# Patient Record
Sex: FEMALE | Race: WHITE | Employment: UNEMPLOYED | URBAN - METROPOLITAN AREA
[De-identification: names, ages, dates, MRNs, and addresses within clinical notes are randomized per-mention and may not be internally consistent; named-entity substitution may affect disease eponyms.]

---
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
## 2023-12-19 LAB
ABO, EXTERNAL RESULT: NORMAL
HEP B, EXTERNAL RESULT: NEGATIVE
HEPATITIS C ANTIBODY, EXTERNAL RESULT: NONREACTIVE
HIV, EXTERNAL RESULT: NONREACTIVE
RH FACTOR, EXTERNAL RESULT: POSITIVE
RUBELLA TITER, EXTERNAL RESULT: NORMAL

## 2023-12-28 ENCOUNTER — HOSPITAL ENCOUNTER (OUTPATIENT)
Dept: ULTRASOUND IMAGING | Age: 18
Discharge: HOME | End: 2023-12-28
Attending: STUDENT IN AN ORGANIZED HEALTH CARE EDUCATION/TRAINING PROGRAM

## 2023-12-28 DIAGNOSIS — O36.80X0 PREGNANCY WITH INCONCLUSIVE FETAL VIABILITY, SINGLE OR UNSPECIFIED FETUS: ICD-10-CM

## 2023-12-28 PROCEDURE — 76815 OB US LIMITED FETUS(S): CPT

## 2024-01-02 LAB
C. TRACHOMATIS, EXTERNAL RESULT: NEGATIVE
N. GONORRHOEAE, EXTERNAL RESULT: NEGATIVE

## 2024-06-12 LAB
GBS, EXTERNAL RESULT: NEGATIVE
RPR, EXTERNAL RESULT: NONREACTIVE

## 2024-06-30 ENCOUNTER — HOSPITAL ENCOUNTER (OUTPATIENT)
Age: 19
Discharge: HOME HEALTH | End: 2024-07-01
Attending: OBSTETRICS & GYNECOLOGY | Admitting: OBSTETRICS & GYNECOLOGY
Payer: COMMERCIAL

## 2024-07-01 VITALS
WEIGHT: 175 LBS | DIASTOLIC BLOOD PRESSURE: 86 MMHG | OXYGEN SATURATION: 98 % | RESPIRATION RATE: 16 BRPM | BODY MASS INDEX: 29.16 KG/M2 | HEART RATE: 88 BPM | TEMPERATURE: 98.2 F | SYSTOLIC BLOOD PRESSURE: 124 MMHG | HEIGHT: 65 IN

## 2024-07-01 PROBLEM — O47.9 BRAXTON HICKS CONTRACTIONS: Status: ACTIVE | Noted: 2024-07-01

## 2024-07-01 PROBLEM — O99.013 ANEMIA COMPLICATING PREGNANCY IN THIRD TRIMESTER: Status: ACTIVE | Noted: 2024-07-01

## 2024-07-01 PROCEDURE — 99203 OFFICE O/P NEW LOW 30 MIN: CPT

## 2024-07-01 RX ORDER — FAMOTIDINE 20 MG/1
20 TABLET, FILM COATED ORAL 2 TIMES DAILY
COMMUNITY

## 2024-07-01 RX ORDER — FERROUS SULFATE 325(65) MG
325 TABLET ORAL 2 TIMES DAILY
COMMUNITY

## 2024-07-01 NOTE — DISCHARGE SUMMARY
Department of Obstetrics and Gynecology  Attending DischargeSalma rosa         SUBJECTIVE:  17 y/o  @ 39.3 wks by 12 wk US  c/w 18 wk US, final EDC 24 presents to L&D c/o ?LOF and contractions. Pt was seen by  Dr. Gordillo & had a negative fern test along w/ physical exam neg for spontaneous rupture of membranes. She was monitored for an additional two hours and had no cervical change. Pt denies VB.  Reports she is feeling very few contractions and fetal movement is present.    Preg c/b pectus excavatum surgery, vaping nicotine, hemorrhoids, anemia    PNL  T&S - A positive  Rubella - Immune  RPR NR  UA Cx - Neg  HBsAg - Neg  HIV Neg  HCV Neg  GC/CT/Trich - Neg  1 hr GCT - 99 (24)  RPR  - NR  GBS -Negative    OBJECTIVE    Vitals:  Vitals:    24 2359   BP: 124/86   Pulse: 88   Resp: 16   Temp: 98.2 °F (36.8 °C)   TempSrc: Oral   SpO2: 98%   Weight: 79.4 kg (175 lb)   Height: 1.651 m (5' 5\")        CONSTITUTIONAL:  awake, alert, cooperative, no apparent distress, and appears stated age  LUNGS:  No increased work of breathing, good air exchange, clear to auscultation bilaterally, no crackles or wheezing  CARDIOVASCULAR:  Normal apical impulse, regular rate and rhythm,  ABDOMEN: Gravid, no scars, normal bowel sounds, soft, non-distended, non-tender, no masses palpated  EXT: No C/C/E    Cervix:           FT/50/-3/posterior  (Exam per Triage RN - Iram)    Fetal Position:  Cephalic    Membranes:  Intact    Fetal heart rate:         Baseline Heart Rate:  125, Cat I         Accelerations:  present       Decelerations:  absent       Variability:  moderate    Contraction frequency: q 7 minutes      Discharge condition: stable    ASSESSMENT & PLAN:    17 y/o  @ 39.3 wks here to r/o SROM & Labor  1)SROM- no s/sx's of SROM per Dr. Gordillo's note  2)Labor - no cervical change since last exam in office last week; LW/ANITA d/w pt. Pt encouraged to keep sched appt on 24.  3)D/C to home

## 2024-07-01 NOTE — PROGRESS NOTES
Call to Dr. Dotson to inform her of pt arrival. I told her about pt c/o gush of fluid at 1745 and that ctx started at around 1900. Cat 1 tracing with 2 ctx 10 min apart rating them a 2 on the 1-10 pain scale. Orders received to have House MD bradford and then check pt and if suzette negative, monitor for another hour and recheck cervix.

## 2024-07-01 NOTE — DISCHARGE INSTRUCTIONS
If you had a vaginal exam you may have some bloody mucous or brown vaginal discharge.   Call office with any questions  Drink plenty of water  Go to your scheduled appointment on 7/5/24        Labor Check: After Your Visit   Your Care Instructions   If you pass your due date and your labor does not start on its own, your doctor may want to try to start (induce) labor. Your doctor may suggest inducing labor for other reasons. It may be a good idea to induce labor if you have another medical condition, such as high blood pressure, or if the placenta can no longer give enough support to the baby.   Your doctor may take several steps to get your labor going.   Your doctor will check to see if the opening to the womb (cervix) is ready and if your baby is low in your pelvis. The cervix is ready for active labor when it is soft and begins to open. If it is not ready, your doctor may use medicine to soften it.   Your doctor may \"break your water\" (rupture the amniotic sac) if your cervix is soft and has begun to open but active labor does not start. To break your sac, your doctor will insert a slim instrument into your vagina. He or she will pull gently on the sac until it breaks. You probably will not feel any pain from the sac breaking. But you may feel and hear a large gush of fluid.   If you do not start active labor after your sac breaks, your doctor probably will give you medicine such as oxytocin (Pitocin) to start labor and keep your labor going.    Follow-up care is a key part of your treatment and safety. Be sure to make and go to all appointments, and call your doctor if you are having problems. It's also a good idea to know your test results and keep a list of the medicines you take.     When should your labor be induced?   Your pregnancy has gone 1 to 2 weeks past your expected due date.   You have a medical condition, such as high blood pressure, preeclampsia, or diabetes, that may harm your health or the

## 2024-07-01 NOTE — DISCHARGE SUMMARY
Department of Obstetrics and Gynecology  Attending DischargeSummary         SUBJECTIVE:  19 y/o  @ 39.3 wks by FDLMP 12 US c/w 18 wk US, final EDC 24 presents to L&D c/o ?LOF and contractions. Pt was seen by LUCIANA Gordillo & had a negative fern test along w/ physical exam neg for spontaneous rupture of membranes. She was monitored for an additional two hours and had no cervical change. Pt denies VB.  Reports she is feeling very few contractions and fetal movement is present.      OBJECTIVE    Vitals:  24 @23:45    BP              124/86     BP      Temp              98.2 ...     Temp     Pulse              88     Pulse     Respirations              16     Respirations     SpO2              98     SpO2                CONSTITUTIONAL:  awake, alert, cooperative, no apparent distress, and appears stated age  LUNGS:  No increased work of breathing, good air exchange, clear to auscultation bilaterally, no crackles or wheezing  CARDIOVASCULAR:  Normal apical impulse, regular rate and rhythm,  ABDOMEN: Gravid, no scars, normal bowel sounds, soft, non-distended, non-tender, no masses palpated  EXT: No C/C/E    Cervix:           FT/50/-3/posterior  (Exam per Triage RN - Iram)    Fetal Position:  Cephalic    Membranes:  Intact    Fetal heart rate:         Baseline Heart Rate:  125, Cat I         Accelerations:  present       Decelerations:  absent       Variability:  moderate    Contraction frequency: q 7 minutes      Discharge condition: stable    ASSESSMENT & PLAN:    19 y/o  @ 39.3 wks here to r/o SROM & Labor  1)SROM- no s/sx's of SROM per Dr. Gordillo's note  2)Labor - no cervical change since last exam in office last week; ADIEL/ANITA d/w pt. Pt encouraged to keep sched appt on 24.  3)D/C to home

## 2024-07-01 NOTE — H&P
Department of Obstetrics and Gynecology  Labor and Delivery  Attending Triage Note      SUBJECTIVE:  Pt. c/o leakage of fluid. States earlier this evening had a large gush of fluid, thinks it has maybe continued since then. No VB, +FM. Having some contractions as well.     OB History    Para Term  AB Living   1 0 0 0 0 0   SAB IAB Ectopic Molar Multiple Live Births   0 0 0 0 0 0      # Outcome Date GA Lbr Rock/2nd Weight Sex Delivery Anes PTL Lv   1 Current            .  Past Medical History:   Diagnosis Date    Anemia      Past Surgical History:   Procedure Laterality Date    PECTUS EXCAVATUM SURGERY      TONSILLECTOMY      TONSILLECTOMY AND ADENOIDECTOMY       No current facility-administered medications on file prior to encounter.     Current Outpatient Medications on File Prior to Encounter   Medication Sig Dispense Refill    Prenatal MV-Min-Fe Fum-FA-DHA (PRENATAL 1 PO) Take by mouth      ferrous sulfate (IRON 325) 325 (65 Fe) MG tablet Take 1 tablet by mouth 2 times daily      famotidine (PEPCID) 20 MG tablet Take 1 tablet by mouth 2 times daily      Doxylamine Succinate, Sleep, (UNISOM PO) Take 1 tablet by mouth nightly as needed (for sleep)       No Known Allergies      OBJECTIVE    Vitals:  Vitals:    24 2359   BP: 124/86   Pulse: 88   Resp: 16   Temp: 98.2 °F (36.8 °C)   SpO2: 98%       Physical Exam  HENT:      Head: Normocephalic.   Cardiovascular:      Rate and Rhythm: Normal rate.   Pulmonary:      Effort: Pulmonary effort is normal.   Abdominal:      Palpations: Abdomen is soft.      Tenderness: There is no abdominal tenderness. There is no guarding or rebound.   Genitourinary:     Comments: SSE: negative pool, fern, valsalva  Neurological:      Mental Status: She is alert.       Cervix:  fingertip/50/-3  Membranes:  Intact    Fetal heart rate:  125, moderate variability, +accel, -decel  Contraction frequency: q3-7min      ASSESSMENT & PLAN:    Sarah Smith is a 18 y.o.

## 2024-07-01 NOTE — PROGRESS NOTES
Madan Pendleton MD notified about pt in triage that needed a FERN test. She said she would be right out

## 2024-07-01 NOTE — PROGRESS NOTES
Madan Pendleton MD at bedside explaining that pt was FERN negative at this time.     I explained to patient that Dr. Dotson wanted us to watch her for an hour and recheck her cervix. Patient was fine with that plan

## 2024-07-01 NOTE — PROGRESS NOTES
Pt verbalized understanding of verbal and written discharge instructions and denies having questions at this time. Pt left OB unit at 0211 ambulatory, undelivered, and in stable condition, accompanied by FOB. Patient is not in active labor.

## 2024-07-09 ENCOUNTER — HOSPITAL ENCOUNTER (INPATIENT)
Age: 19
LOS: 3 days | Discharge: HOME | End: 2024-07-12
Attending: OBSTETRICS & GYNECOLOGY | Admitting: OBSTETRICS & GYNECOLOGY
Payer: COMMERCIAL

## 2024-07-09 ENCOUNTER — ANESTHESIA (OUTPATIENT)
Dept: LABOR AND DELIVERY | Age: 19
End: 2024-07-09
Payer: COMMERCIAL

## 2024-07-09 ENCOUNTER — ANESTHESIA EVENT (OUTPATIENT)
Dept: LABOR AND DELIVERY | Age: 19
End: 2024-07-09
Payer: COMMERCIAL

## 2024-07-09 PROBLEM — O47.00 PRETERM CONTRACTIONS: Status: ACTIVE | Noted: 2024-07-09

## 2024-07-09 LAB
ABO + RH BLD: NORMAL
AMPHETAMINES UR QL SCN>1000 NG/ML: NORMAL
BARBITURATES UR QL SCN>200 NG/ML: NORMAL
BENZODIAZ UR QL SCN>200 NG/ML: NORMAL
BLD GP AB SCN SERPL QL: NORMAL
BUPRENORPHINE+NOR UR QL SCN: NORMAL
CANNABINOIDS UR QL SCN>50 NG/ML: NORMAL
COCAINE UR QL SCN: NORMAL
DEPRECATED RDW RBC AUTO: 15.9 % (ref 12.4–15.4)
DRUG SCREEN COMMENT UR-IMP: NORMAL
FENTANYL SCREEN, URINE: NORMAL
HCT VFR BLD AUTO: 32.5 % (ref 36–48)
HGB BLD-MCNC: 11.4 G/DL (ref 12–16)
MCH RBC QN AUTO: 32.3 PG (ref 26–34)
MCHC RBC AUTO-ENTMCNC: 35.1 G/DL (ref 31–36)
MCV RBC AUTO: 91.9 FL (ref 80–100)
METHADONE UR QL SCN>300 NG/ML: NORMAL
OPIATES UR QL SCN>300 NG/ML: NORMAL
OXYCODONE UR QL SCN: NORMAL
PCP UR QL SCN>25 NG/ML: NORMAL
PH UR STRIP: 7 [PH]
PLATELET # BLD AUTO: 234 K/UL (ref 135–450)
PMV BLD AUTO: 8 FL (ref 5–10.5)
RBC # BLD AUTO: 3.53 M/UL (ref 4–5.2)
WBC # BLD AUTO: 10.4 K/UL (ref 4–11)

## 2024-07-09 PROCEDURE — 86901 BLOOD TYPING SEROLOGIC RH(D): CPT

## 2024-07-09 PROCEDURE — 86900 BLOOD TYPING SEROLOGIC ABO: CPT

## 2024-07-09 PROCEDURE — 86850 RBC ANTIBODY SCREEN: CPT

## 2024-07-09 PROCEDURE — 85027 COMPLETE CBC AUTOMATED: CPT

## 2024-07-09 PROCEDURE — 6360000002 HC RX W HCPCS: Performed by: OBSTETRICS & GYNECOLOGY

## 2024-07-09 PROCEDURE — 80307 DRUG TEST PRSMV CHEM ANLYZR: CPT

## 2024-07-09 PROCEDURE — 1220000000 HC SEMI PRIVATE OB R&B

## 2024-07-09 PROCEDURE — 6360000002 HC RX W HCPCS: Performed by: NURSE ANESTHETIST, CERTIFIED REGISTERED

## 2024-07-09 PROCEDURE — 86780 TREPONEMA PALLIDUM: CPT

## 2024-07-09 PROCEDURE — 2500000003 HC RX 250 WO HCPCS: Performed by: NURSE ANESTHETIST, CERTIFIED REGISTERED

## 2024-07-09 PROCEDURE — 2580000003 HC RX 258: Performed by: OBSTETRICS & GYNECOLOGY

## 2024-07-09 RX ORDER — TERBUTALINE SULFATE 1 MG/ML
0.25 INJECTION, SOLUTION SUBCUTANEOUS
Status: DISCONTINUED | OUTPATIENT
Start: 2024-07-09 | End: 2024-07-10

## 2024-07-09 RX ORDER — ACETAMINOPHEN 325 MG/1
650 TABLET ORAL EVERY 4 HOURS PRN
Status: DISCONTINUED | OUTPATIENT
Start: 2024-07-09 | End: 2024-07-10

## 2024-07-09 RX ORDER — BUPIVACAINE HYDROCHLORIDE 2.5 MG/ML
INJECTION, SOLUTION EPIDURAL; INFILTRATION; INTRACAUDAL
Status: COMPLETED
Start: 2024-07-09 | End: 2024-07-09

## 2024-07-09 RX ORDER — SODIUM CHLORIDE, SODIUM LACTATE, POTASSIUM CHLORIDE, AND CALCIUM CHLORIDE .6; .31; .03; .02 G/100ML; G/100ML; G/100ML; G/100ML
500 INJECTION, SOLUTION INTRAVENOUS PRN
Status: DISCONTINUED | OUTPATIENT
Start: 2024-07-09 | End: 2024-07-10

## 2024-07-09 RX ORDER — METHYLERGONOVINE MALEATE 0.2 MG/ML
200 INJECTION INTRAVENOUS PRN
Status: DISCONTINUED | OUTPATIENT
Start: 2024-07-09 | End: 2024-07-10

## 2024-07-09 RX ORDER — TRANEXAMIC ACID 10 MG/ML
1000 INJECTION, SOLUTION INTRAVENOUS
Status: DISCONTINUED | OUTPATIENT
Start: 2024-07-09 | End: 2024-07-10

## 2024-07-09 RX ORDER — SODIUM CHLORIDE 0.9 % (FLUSH) 0.9 %
5-40 SYRINGE (ML) INJECTION EVERY 12 HOURS SCHEDULED
Status: DISCONTINUED | OUTPATIENT
Start: 2024-07-09 | End: 2024-07-10

## 2024-07-09 RX ORDER — SODIUM CHLORIDE 0.9 % (FLUSH) 0.9 %
5-40 SYRINGE (ML) INJECTION PRN
Status: DISCONTINUED | OUTPATIENT
Start: 2024-07-09 | End: 2024-07-10

## 2024-07-09 RX ORDER — BUPIVACAINE HYDROCHLORIDE 2.5 MG/ML
INJECTION, SOLUTION EPIDURAL; INFILTRATION; INTRACAUDAL PRN
Status: DISCONTINUED | OUTPATIENT
Start: 2024-07-09 | End: 2024-07-10 | Stop reason: SDUPTHER

## 2024-07-09 RX ORDER — DOCUSATE SODIUM 100 MG/1
1 CAPSULE, LIQUID FILLED ORAL DAILY
COMMUNITY
Start: 2024-06-12

## 2024-07-09 RX ORDER — ONDANSETRON 4 MG/1
4 TABLET, ORALLY DISINTEGRATING ORAL EVERY 6 HOURS PRN
Status: DISCONTINUED | OUTPATIENT
Start: 2024-07-09 | End: 2024-07-10

## 2024-07-09 RX ORDER — SODIUM CHLORIDE, SODIUM LACTATE, POTASSIUM CHLORIDE, CALCIUM CHLORIDE 600; 310; 30; 20 MG/100ML; MG/100ML; MG/100ML; MG/100ML
INJECTION, SOLUTION INTRAVENOUS CONTINUOUS
Status: DISCONTINUED | OUTPATIENT
Start: 2024-07-09 | End: 2024-07-10

## 2024-07-09 RX ORDER — MISOPROSTOL 100 UG/1
400 TABLET ORAL PRN
Status: DISCONTINUED | OUTPATIENT
Start: 2024-07-09 | End: 2024-07-10

## 2024-07-09 RX ORDER — CARBOPROST TROMETHAMINE 250 UG/ML
250 INJECTION, SOLUTION INTRAMUSCULAR PRN
Status: DISCONTINUED | OUTPATIENT
Start: 2024-07-09 | End: 2024-07-10

## 2024-07-09 RX ORDER — SODIUM CHLORIDE 9 MG/ML
25 INJECTION, SOLUTION INTRAVENOUS PRN
Status: DISCONTINUED | OUTPATIENT
Start: 2024-07-09 | End: 2024-07-10

## 2024-07-09 RX ORDER — ONDANSETRON 2 MG/ML
4 INJECTION INTRAMUSCULAR; INTRAVENOUS EVERY 6 HOURS PRN
Status: DISCONTINUED | OUTPATIENT
Start: 2024-07-09 | End: 2024-07-10

## 2024-07-09 RX ADMIN — SODIUM CHLORIDE, POTASSIUM CHLORIDE, SODIUM LACTATE AND CALCIUM CHLORIDE: 600; 310; 30; 20 INJECTION, SOLUTION INTRAVENOUS at 19:24

## 2024-07-09 RX ADMIN — Medication 1 MILLI-UNITS/MIN: at 18:00

## 2024-07-09 RX ADMIN — SODIUM CHLORIDE, POTASSIUM CHLORIDE, SODIUM LACTATE AND CALCIUM CHLORIDE: 600; 310; 30; 20 INJECTION, SOLUTION INTRAVENOUS at 17:14

## 2024-07-09 RX ADMIN — SODIUM CHLORIDE, POTASSIUM CHLORIDE, SODIUM LACTATE AND CALCIUM CHLORIDE: 600; 310; 30; 20 INJECTION, SOLUTION INTRAVENOUS at 20:24

## 2024-07-09 RX ADMIN — Medication 15 ML/HR: at 21:04

## 2024-07-09 RX ADMIN — BUPIVACAINE HYDROCHLORIDE 1.2 ML: 2.5 INJECTION, SOLUTION EPIDURAL; INFILTRATION; INTRACAUDAL; PERINEURAL at 20:59

## 2024-07-09 RX ADMIN — SODIUM CHLORIDE, SODIUM LACTATE, POTASSIUM CHLORIDE, AND CALCIUM CHLORIDE 1000 ML: .6; .31; .03; .02 INJECTION, SOLUTION INTRAVENOUS at 19:50

## 2024-07-09 NOTE — DISCHARGE INSTRUCTIONS
Thank you for the opportunity to care for you and your family.    We hope that you are happy with the care we provided during your stay in the [facility]. We want to ensure that you have the help that you need when you leave the hospital. If there is anything that we can assist you with please let us know.    Breastfeeding mothers may contact our lactation specialists with any problems or questions.  [facility] lactation services phone number is [phone].  Leave a message and your call will be returned.    Please refer to the information provided in the postpartum care booklet.  The following are warning signs to remember.        Call 911 if you have:    Chest pain or pressure  Shortness of breath, even at rest  Thoughts of hurting yourself or others  Seizures    Call your healthcare provider if you have:    Temperature of 100.4 degrees or higher  Stitches that are not healing             --Swelling, bleeding, drainage, foul odor, redness or warmth in/around your                 stitches, staples, or incision (scar)               -- Bad smelling blood or discharge from the vagina  Vaginal bleeding that has increased             --Soaking through one pad in an hour             --You are passing clots larger than the size of a lemon.  Red, warm tender area(s) in your breast or calf.  Headache that does not get better, even after taking medicine; or headache with vision changes    Remember to notify all healthcare providers from your date of delivery up to one year after birth!    CARING FOR YOURSELF      DIET/ACTIVITY    Eat a well balanced diet focusing on food high in fiber and protein.  Drink plenty of fluids, especially water.  To avoid constipation you may take a mild stool softener as recommended by your doctor or midwife.  Gradually increase your activity. Resume an exercise regime only after being advised by your doctor or midwife.  When sitting or lying down, keep your legs elevated to

## 2024-07-09 NOTE — PROGRESS NOTES
MD Arechiga to bedside for SVE. PT remains unchanged @ 3/70/-2. Pitocin currently @ 4 mu/min. No change in current POC. Plan to recheck around 2345 per MD Arechiga.  Leona Mcknight RN

## 2024-07-09 NOTE — PLAN OF CARE
Problem: Vaginal Birth or  Section  Goal: Fetal and maternal status remain reassuring during the birth process  Description:  Birth OB-Pregnancy care plan goal which identifies if the fetal and maternal status remain reassuring during the birth process  Outcome: Progressing     Problem: Pain  Goal: Verbalizes/displays adequate comfort level or baseline comfort level  Outcome: Progressing  Flowsheets (Taken 2024 3095 by Donita Pacheco RN)  Verbalizes/displays adequate comfort level or baseline comfort level:   Encourage patient to monitor pain and request assistance   Assess pain using appropriate pain scale   Administer analgesics based on type and severity of pain and evaluate response   Implement non-pharmacological measures as appropriate and evaluate response   Consider cultural and social influences on pain and pain management   Notify Licensed Independent Practitioner if interventions unsuccessful or patient reports new pain     Problem: Safety - Adult  Goal: Free from fall injury  Outcome: Progressing

## 2024-07-09 NOTE — H&P
Department of Obstetrics and Gynecology  Attending History and Physical        CHIEF COMPLAINT:  contractions    HISTORY OF PRESENT ILLNESS:      The patient is a 18 y.o.  1 parity 0 at 40 weeks 4 days by LMP c/w 12 & 18 wk sono, EDC 24 presents for above. She was seen in the office today and had membranes sweeping performed.  She showed up to L&D triage c/o ctx of increased frequency and intensity. Tadeo irregularly q 2-7 mins. Pain 4/10. Reports fetal movement, denies leakage of fluid, vaginal bleeding.  history: Pectus excavatum- rods in place, nicotine vape use, hemorrhoids, hx anemia- hbg on admission 11.3.     PRENATAL CARE:    [facility]    Blood Type/Rh:  A+  Antibody Screen:  Neg  Rubella:  Immune  RPR:  NR  Hepatitis B Surface Antigen: Neg  HIV:  Neg  Gonorrhea:  Neg  Chlamydia:  Neg  1 hour Glucose Tolerance Test:  99 mg/dL (on 24)  Group B Strep:  negative  (on 24)    cfDNA: ([id number]) negative screen  AFP only: negative screen    Past Medical History:        Diagnosis Date    Anemia      Past Surgical History:        Procedure Laterality Date    PECTUS EXCAVATUM SURGERY      TONSILLECTOMY      TONSILLECTOMY AND ADENOIDECTOMY           OB History    Para Term  AB Living   1             SAB IAB Ectopic Molar Multiple Live Births                    # Outcome Date GA Lbr Rock/2nd Weight Sex Delivery Anes PTL Lv   1 Current                Medications Prior to Admission:   Medications Prior to Admission: docusate sodium (COLACE) 100 MG capsule, Take 1 capsule by mouth daily  Prenatal MV-Min-Fe Fum-FA-DHA (PRENATAL 1 PO), Take by mouth  ferrous sulfate (IRON 325) 325 (65 Fe) MG tablet, Take 1 tablet by mouth 2 times daily  famotidine (PEPCID) 20 MG tablet, Take 1 tablet by mouth 2 times daily  Doxylamine Succinate, Sleep, (UNISOM PO), Take 1 tablet by mouth nightly as needed (for sleep) (Patient not taking: Reported on

## 2024-07-09 NOTE — PROGRESS NOTES
Report received from ELSA Pacheco RN. Bedside report given. Introduced myself to pt as her RN for the day. I put my name and phone number on the white board and showed pt how to use her room phone to get a hold of me. Pt was given her plan of care for the day. Call light within reach. Bed in lowest position and wheels are locked. Pt verbalized understanding and denies any further needs at this time.Continue to monitor.  Leona Mcknight RN

## 2024-07-09 NOTE — FLOWSHEET NOTE
Patient presents to triage with c/o contractions worsening since 11am.  PLaced on EFM and VSS.  Will update Dr Arechiga on patients arrival

## 2024-07-10 PROBLEM — Z98.891 STATUS POST PRIMARY LOW TRANSVERSE CESAREAN SECTION: Status: ACTIVE | Noted: 2024-07-10

## 2024-07-10 PROBLEM — Z37.9 NORMAL LABOR: Status: ACTIVE | Noted: 2024-07-10

## 2024-07-10 PROBLEM — O47.00 PRETERM CONTRACTIONS: Status: RESOLVED | Noted: 2024-07-09 | Resolved: 2024-07-10

## 2024-07-10 LAB — REAGIN+T PALLIDUM IGG+IGM SERPL-IMP: NORMAL

## 2024-07-10 PROCEDURE — 6360000002 HC RX W HCPCS: Performed by: NURSE ANESTHETIST, CERTIFIED REGISTERED

## 2024-07-10 PROCEDURE — 2500000003 HC RX 250 WO HCPCS: Performed by: NURSE ANESTHETIST, CERTIFIED REGISTERED

## 2024-07-10 PROCEDURE — 3700000000 HC ANESTHESIA ATTENDED CARE: Performed by: STUDENT IN AN ORGANIZED HEALTH CARE EDUCATION/TRAINING PROGRAM

## 2024-07-10 PROCEDURE — 3609079900 HC CESAREAN SECTION: Performed by: STUDENT IN AN ORGANIZED HEALTH CARE EDUCATION/TRAINING PROGRAM

## 2024-07-10 PROCEDURE — 2580000003 HC RX 258: Performed by: OBSTETRICS & GYNECOLOGY

## 2024-07-10 PROCEDURE — 6360000002 HC RX W HCPCS: Performed by: STUDENT IN AN ORGANIZED HEALTH CARE EDUCATION/TRAINING PROGRAM

## 2024-07-10 PROCEDURE — 1220000000 HC SEMI PRIVATE OB R&B

## 2024-07-10 PROCEDURE — 2580000003 HC RX 258: Performed by: STUDENT IN AN ORGANIZED HEALTH CARE EDUCATION/TRAINING PROGRAM

## 2024-07-10 PROCEDURE — 6370000000 HC RX 637 (ALT 250 FOR IP): Performed by: STUDENT IN AN ORGANIZED HEALTH CARE EDUCATION/TRAINING PROGRAM

## 2024-07-10 PROCEDURE — 2709999900 HC NON-CHARGEABLE SUPPLY: Performed by: STUDENT IN AN ORGANIZED HEALTH CARE EDUCATION/TRAINING PROGRAM

## 2024-07-10 PROCEDURE — 3700000001 HC ADD 15 MINUTES (ANESTHESIA): Performed by: STUDENT IN AN ORGANIZED HEALTH CARE EDUCATION/TRAINING PROGRAM

## 2024-07-10 PROCEDURE — 2580000003 HC RX 258: Performed by: NURSE ANESTHETIST, CERTIFIED REGISTERED

## 2024-07-10 PROCEDURE — 6360000002 HC RX W HCPCS: Performed by: OBSTETRICS & GYNECOLOGY

## 2024-07-10 PROCEDURE — 7100000001 HC PACU RECOVERY - ADDTL 15 MIN: Performed by: STUDENT IN AN ORGANIZED HEALTH CARE EDUCATION/TRAINING PROGRAM

## 2024-07-10 PROCEDURE — 7100000000 HC PACU RECOVERY - FIRST 15 MIN: Performed by: STUDENT IN AN ORGANIZED HEALTH CARE EDUCATION/TRAINING PROGRAM

## 2024-07-10 RX ORDER — PROPOFOL 10 MG/ML
INJECTION, EMULSION INTRAVENOUS PRN
Status: DISCONTINUED | OUTPATIENT
Start: 2024-07-10 | End: 2024-07-10 | Stop reason: SDUPTHER

## 2024-07-10 RX ORDER — OXYCODONE HYDROCHLORIDE 5 MG/1
5 TABLET ORAL EVERY 4 HOURS PRN
Status: DISCONTINUED | OUTPATIENT
Start: 2024-07-10 | End: 2024-07-12 | Stop reason: HOSPADM

## 2024-07-10 RX ORDER — BUPIVACAINE HYDROCHLORIDE 2.5 MG/ML
INJECTION, SOLUTION EPIDURAL; INFILTRATION; INTRACAUDAL PRN
Status: DISCONTINUED | OUTPATIENT
Start: 2024-07-10 | End: 2024-07-10 | Stop reason: SDUPTHER

## 2024-07-10 RX ORDER — FAMOTIDINE 10 MG/ML
20 INJECTION, SOLUTION INTRAVENOUS 2 TIMES DAILY
Status: DISCONTINUED | OUTPATIENT
Start: 2024-07-10 | End: 2024-07-12 | Stop reason: HOSPADM

## 2024-07-10 RX ORDER — CEFAZOLIN SODIUM IN 0.9 % NACL 2 G/100 ML
2000 PLASTIC BAG, INJECTION (ML) INTRAVENOUS ONCE
Status: COMPLETED | OUTPATIENT
Start: 2024-07-10 | End: 2024-07-10

## 2024-07-10 RX ORDER — BUPIVACAINE HYDROCHLORIDE 5 MG/ML
INJECTION, SOLUTION EPIDURAL; INTRACAUDAL PRN
Status: DISCONTINUED | OUTPATIENT
Start: 2024-07-10 | End: 2024-07-10 | Stop reason: SDUPTHER

## 2024-07-10 RX ORDER — DIPHENHYDRAMINE HYDROCHLORIDE 50 MG/ML
25 INJECTION INTRAMUSCULAR; INTRAVENOUS EVERY 6 HOURS PRN
Status: DISCONTINUED | OUTPATIENT
Start: 2024-07-10 | End: 2024-07-12 | Stop reason: HOSPADM

## 2024-07-10 RX ORDER — MORPHINE SULFATE 10 MG/ML
INJECTION, SOLUTION INTRAMUSCULAR; INTRAVENOUS PRN
Status: DISCONTINUED | OUTPATIENT
Start: 2024-07-10 | End: 2024-07-10 | Stop reason: SDUPTHER

## 2024-07-10 RX ORDER — FAMOTIDINE 10 MG/ML
INJECTION, SOLUTION INTRAVENOUS PRN
Status: DISCONTINUED | OUTPATIENT
Start: 2024-07-10 | End: 2024-07-10 | Stop reason: SDUPTHER

## 2024-07-10 RX ORDER — ONDANSETRON 4 MG/1
4 TABLET, ORALLY DISINTEGRATING ORAL EVERY 8 HOURS PRN
Status: DISCONTINUED | OUTPATIENT
Start: 2024-07-10 | End: 2024-07-12 | Stop reason: HOSPADM

## 2024-07-10 RX ORDER — LIDOCAINE HYDROCHLORIDE 15 MG/ML
INJECTION, SOLUTION EPIDURAL; INFILTRATION; INTRACAUDAL; PERINEURAL PRN
Status: DISCONTINUED | OUTPATIENT
Start: 2024-07-10 | End: 2024-07-10 | Stop reason: SDUPTHER

## 2024-07-10 RX ORDER — SODIUM CHLORIDE 0.9 % (FLUSH) 0.9 %
5-40 SYRINGE (ML) INJECTION EVERY 12 HOURS SCHEDULED
Status: DISCONTINUED | OUTPATIENT
Start: 2024-07-10 | End: 2024-07-12 | Stop reason: HOSPADM

## 2024-07-10 RX ORDER — SODIUM CHLORIDE 0.9 % (FLUSH) 0.9 %
5-40 SYRINGE (ML) INJECTION PRN
Status: DISCONTINUED | OUTPATIENT
Start: 2024-07-10 | End: 2024-07-12 | Stop reason: HOSPADM

## 2024-07-10 RX ORDER — ONDANSETRON 2 MG/ML
INJECTION INTRAMUSCULAR; INTRAVENOUS
Status: COMPLETED
Start: 2024-07-10 | End: 2024-07-10

## 2024-07-10 RX ORDER — KETOROLAC TROMETHAMINE 30 MG/ML
30 INJECTION, SOLUTION INTRAMUSCULAR; INTRAVENOUS EVERY 6 HOURS
Status: DISPENSED | OUTPATIENT
Start: 2024-07-10 | End: 2024-07-11

## 2024-07-10 RX ORDER — OXYTOCIN 10 [USP'U]/ML
INJECTION, SOLUTION INTRAMUSCULAR; INTRAVENOUS PRN
Status: DISCONTINUED | OUTPATIENT
Start: 2024-07-10 | End: 2024-07-10 | Stop reason: SDUPTHER

## 2024-07-10 RX ORDER — IBUPROFEN 800 MG/1
800 TABLET ORAL EVERY 8 HOURS
Status: DISCONTINUED | OUTPATIENT
Start: 2024-07-11 | End: 2024-07-12 | Stop reason: HOSPADM

## 2024-07-10 RX ORDER — FENTANYL CITRATE 50 UG/ML
INJECTION, SOLUTION INTRAMUSCULAR; INTRAVENOUS PRN
Status: DISCONTINUED | OUTPATIENT
Start: 2024-07-10 | End: 2024-07-10 | Stop reason: SDUPTHER

## 2024-07-10 RX ORDER — SODIUM CHLORIDE 9 MG/ML
INJECTION, SOLUTION INTRAVENOUS PRN
Status: DISCONTINUED | OUTPATIENT
Start: 2024-07-10 | End: 2024-07-12 | Stop reason: HOSPADM

## 2024-07-10 RX ORDER — FAMOTIDINE 10 MG/ML
INJECTION, SOLUTION INTRAVENOUS
Status: COMPLETED
Start: 2024-07-10 | End: 2024-07-10

## 2024-07-10 RX ORDER — OXYCODONE HYDROCHLORIDE 5 MG/1
10 TABLET ORAL EVERY 4 HOURS PRN
Status: DISCONTINUED | OUTPATIENT
Start: 2024-07-10 | End: 2024-07-12 | Stop reason: HOSPADM

## 2024-07-10 RX ORDER — KETAMINE HCL IN NACL, ISO-OSM 100MG/10ML
SYRINGE (ML) INJECTION PRN
Status: DISCONTINUED | OUTPATIENT
Start: 2024-07-10 | End: 2024-07-10 | Stop reason: SDUPTHER

## 2024-07-10 RX ORDER — DOCUSATE SODIUM 100 MG/1
100 CAPSULE, LIQUID FILLED ORAL 2 TIMES DAILY
Status: DISCONTINUED | OUTPATIENT
Start: 2024-07-10 | End: 2024-07-12 | Stop reason: HOSPADM

## 2024-07-10 RX ORDER — MORPHINE SULFATE 0.5 MG/ML
INJECTION, SOLUTION EPIDURAL; INTRATHECAL; INTRAVENOUS PRN
Status: DISCONTINUED | OUTPATIENT
Start: 2024-07-10 | End: 2024-07-10 | Stop reason: SDUPTHER

## 2024-07-10 RX ORDER — ONDANSETRON 2 MG/ML
4 INJECTION INTRAMUSCULAR; INTRAVENOUS EVERY 6 HOURS PRN
Status: DISCONTINUED | OUTPATIENT
Start: 2024-07-10 | End: 2024-07-12 | Stop reason: HOSPADM

## 2024-07-10 RX ORDER — FERROUS SULFATE 325(65) MG
325 TABLET ORAL EVERY OTHER DAY
Status: DISCONTINUED | OUTPATIENT
Start: 2024-07-10 | End: 2024-07-12 | Stop reason: HOSPADM

## 2024-07-10 RX ORDER — ACETAMINOPHEN 500 MG
1000 TABLET ORAL EVERY 8 HOURS SCHEDULED
Status: DISCONTINUED | OUTPATIENT
Start: 2024-07-10 | End: 2024-07-12 | Stop reason: HOSPADM

## 2024-07-10 RX ORDER — PRENATAL WITH FERROUS FUM AND FOLIC ACID 3080; 920; 120; 400; 22; 1.84; 3; 20; 10; 1; 12; 200; 27; 25; 2 [IU]/1; [IU]/1; MG/1; [IU]/1; MG/1; MG/1; MG/1; MG/1; MG/1; MG/1; UG/1; MG/1; MG/1; MG/1; MG/1
1 TABLET ORAL DAILY
Status: DISCONTINUED | OUTPATIENT
Start: 2024-07-10 | End: 2024-07-12 | Stop reason: HOSPADM

## 2024-07-10 RX ORDER — METHYLERGONOVINE MALEATE 0.2 MG/ML
200 INJECTION INTRAVENOUS PRN
Status: DISCONTINUED | OUTPATIENT
Start: 2024-07-10 | End: 2024-07-12 | Stop reason: HOSPADM

## 2024-07-10 RX ORDER — LIDOCAINE HYDROCHLORIDE 20 MG/ML
INJECTION, SOLUTION EPIDURAL; INFILTRATION; INTRACAUDAL; PERINEURAL PRN
Status: DISCONTINUED | OUTPATIENT
Start: 2024-07-10 | End: 2024-07-10 | Stop reason: SDUPTHER

## 2024-07-10 RX ORDER — LANOLIN 100 %
OINTMENT (GRAM) TOPICAL
Status: DISCONTINUED | OUTPATIENT
Start: 2024-07-10 | End: 2024-07-12 | Stop reason: HOSPADM

## 2024-07-10 RX ORDER — MIDAZOLAM HYDROCHLORIDE 1 MG/ML
INJECTION INTRAMUSCULAR; INTRAVENOUS PRN
Status: DISCONTINUED | OUTPATIENT
Start: 2024-07-10 | End: 2024-07-10 | Stop reason: SDUPTHER

## 2024-07-10 RX ORDER — ONDANSETRON 2 MG/ML
INJECTION INTRAMUSCULAR; INTRAVENOUS PRN
Status: DISCONTINUED | OUTPATIENT
Start: 2024-07-10 | End: 2024-07-10 | Stop reason: SDUPTHER

## 2024-07-10 RX ADMIN — PROPOFOL 20 MG: 10 INJECTION, EMULSION INTRAVENOUS at 14:39

## 2024-07-10 RX ADMIN — LIDOCAINE HYDROCHLORIDE 2 ML: 20 INJECTION, SOLUTION EPIDURAL; INFILTRATION; INTRACAUDAL; PERINEURAL at 05:52

## 2024-07-10 RX ADMIN — DEXMEDETOMIDINE HYDROCHLORIDE 20 MCG: 100 INJECTION, SOLUTION INTRAVENOUS at 10:25

## 2024-07-10 RX ADMIN — BUPIVACAINE HYDROCHLORIDE 3 ML: 2.5 INJECTION, SOLUTION EPIDURAL; INFILTRATION; INTRACAUDAL at 05:52

## 2024-07-10 RX ADMIN — ONDANSETRON 4 MG: 2 INJECTION INTRAMUSCULAR; INTRAVENOUS at 06:24

## 2024-07-10 RX ADMIN — FENTANYL CITRATE 100 MCG: 50 INJECTION, SOLUTION INTRAMUSCULAR; INTRAVENOUS at 14:52

## 2024-07-10 RX ADMIN — ONDANSETRON 4 MG: 2 INJECTION INTRAMUSCULAR; INTRAVENOUS at 13:16

## 2024-07-10 RX ADMIN — LIDOCAINE HYDROCHLORIDE 5 ML: 20 INJECTION, SOLUTION EPIDURAL; INFILTRATION; INTRACAUDAL; PERINEURAL at 13:31

## 2024-07-10 RX ADMIN — PROPOFOL 40 MG: 10 INJECTION, EMULSION INTRAVENOUS at 14:22

## 2024-07-10 RX ADMIN — BUPIVACAINE HYDROCHLORIDE 3 ML: 5 INJECTION, SOLUTION EPIDURAL; INTRACAUDAL; PERINEURAL at 05:52

## 2024-07-10 RX ADMIN — PROPOFOL 20 MG: 10 INJECTION, EMULSION INTRAVENOUS at 14:25

## 2024-07-10 RX ADMIN — OXYTOCIN 10 UNITS: 10 INJECTION, SOLUTION INTRAMUSCULAR; INTRAVENOUS at 14:31

## 2024-07-10 RX ADMIN — Medication 20 MG: at 13:59

## 2024-07-10 RX ADMIN — FAMOTIDINE 20 MG: 10 INJECTION, SOLUTION INTRAVENOUS at 13:16

## 2024-07-10 RX ADMIN — SODIUM CHLORIDE, POTASSIUM CHLORIDE, SODIUM LACTATE AND CALCIUM CHLORIDE: 600; 310; 30; 20 INJECTION, SOLUTION INTRAVENOUS at 14:31

## 2024-07-10 RX ADMIN — PROPOFOL 20 MG: 10 INJECTION, EMULSION INTRAVENOUS at 14:31

## 2024-07-10 RX ADMIN — MORPHINE SULFATE 2.5 MG: 0.5 INJECTION EPIDURAL; INTRATHECAL; INTRAVENOUS at 14:20

## 2024-07-10 RX ADMIN — SODIUM CHLORIDE, POTASSIUM CHLORIDE, SODIUM LACTATE AND CALCIUM CHLORIDE: 600; 310; 30; 20 INJECTION, SOLUTION INTRAVENOUS at 13:51

## 2024-07-10 RX ADMIN — LIDOCAINE HYDROCHLORIDE 10 ML: 15 INJECTION, SOLUTION EPIDURAL; INFILTRATION; INTRACAUDAL; PERINEURAL at 10:25

## 2024-07-10 RX ADMIN — DOCUSATE SODIUM 100 MG: 100 CAPSULE, LIQUID FILLED ORAL at 21:42

## 2024-07-10 RX ADMIN — KETOROLAC TROMETHAMINE 30 MG: 30 INJECTION, SOLUTION INTRAMUSCULAR at 18:08

## 2024-07-10 RX ADMIN — MORPHINE SULFATE 2.5 MG: 10 INJECTION, SOLUTION INTRAMUSCULAR; INTRAVENOUS at 14:27

## 2024-07-10 RX ADMIN — Medication 20 MG: at 13:58

## 2024-07-10 RX ADMIN — SODIUM CHLORIDE, POTASSIUM CHLORIDE, SODIUM LACTATE AND CALCIUM CHLORIDE: 600; 310; 30; 20 INJECTION, SOLUTION INTRAVENOUS at 00:42

## 2024-07-10 RX ADMIN — ACETAMINOPHEN 1000 MG: 500 TABLET ORAL at 21:42

## 2024-07-10 RX ADMIN — PROPOFOL 40 MG: 10 INJECTION, EMULSION INTRAVENOUS at 14:15

## 2024-07-10 RX ADMIN — SODIUM CHLORIDE, POTASSIUM CHLORIDE, SODIUM LACTATE AND CALCIUM CHLORIDE: 600; 310; 30; 20 INJECTION, SOLUTION INTRAVENOUS at 10:47

## 2024-07-10 RX ADMIN — Medication 2000 MG: at 13:21

## 2024-07-10 RX ADMIN — FENTANYL CITRATE 100 MCG: 50 INJECTION, SOLUTION INTRAMUSCULAR; INTRAVENOUS at 14:17

## 2024-07-10 RX ADMIN — LIDOCAINE HYDROCHLORIDE 10 ML: 20 INJECTION, SOLUTION EPIDURAL; INFILTRATION; INTRACAUDAL; PERINEURAL at 13:12

## 2024-07-10 RX ADMIN — SODIUM CHLORIDE, POTASSIUM CHLORIDE, SODIUM LACTATE AND CALCIUM CHLORIDE: 600; 310; 30; 20 INJECTION, SOLUTION INTRAVENOUS at 15:33

## 2024-07-10 RX ADMIN — Medication 20 MG: at 14:01

## 2024-07-10 RX ADMIN — PROPOFOL 20 MG: 10 INJECTION, EMULSION INTRAVENOUS at 14:35

## 2024-07-10 RX ADMIN — LIDOCAINE HYDROCHLORIDE 5 ML: 20 INJECTION, SOLUTION EPIDURAL; INFILTRATION; INTRACAUDAL; PERINEURAL at 13:50

## 2024-07-10 RX ADMIN — OXYTOCIN 20 UNITS: 10 INJECTION, SOLUTION INTRAMUSCULAR; INTRAVENOUS at 14:04

## 2024-07-10 RX ADMIN — Medication 20 MG: at 14:02

## 2024-07-10 RX ADMIN — ONDANSETRON 4 MG: 2 INJECTION INTRAMUSCULAR; INTRAVENOUS at 00:33

## 2024-07-10 RX ADMIN — AZITHROMYCIN MONOHYDRATE 500 MG: 500 INJECTION, POWDER, LYOPHILIZED, FOR SOLUTION INTRAVENOUS at 13:21

## 2024-07-10 RX ADMIN — SODIUM CHLORIDE, POTASSIUM CHLORIDE, SODIUM LACTATE AND CALCIUM CHLORIDE: 600; 310; 30; 20 INJECTION, SOLUTION INTRAVENOUS at 07:55

## 2024-07-10 RX ADMIN — PROPOFOL 50 MG: 10 INJECTION, EMULSION INTRAVENOUS at 14:17

## 2024-07-10 RX ADMIN — Medication 20 MG: at 14:00

## 2024-07-10 RX ADMIN — PROPOFOL 20 MG: 10 INJECTION, EMULSION INTRAVENOUS at 14:28

## 2024-07-10 RX ADMIN — MIDAZOLAM 2 MG: 1 INJECTION INTRAMUSCULAR; INTRAVENOUS at 14:04

## 2024-07-10 ASSESSMENT — PAIN SCALES - GENERAL: PAINLEVEL_OUTOF10: 2

## 2024-07-10 ASSESSMENT — PAIN DESCRIPTION - ORIENTATION: ORIENTATION: LOWER

## 2024-07-10 ASSESSMENT — PAIN DESCRIPTION - LOCATION: LOCATION: ABDOMEN

## 2024-07-10 ASSESSMENT — PAIN - FUNCTIONAL ASSESSMENT: PAIN_FUNCTIONAL_ASSESSMENT: ACTIVITIES ARE NOT PREVENTED

## 2024-07-10 ASSESSMENT — PAIN DESCRIPTION - DESCRIPTORS: DESCRIPTORS: CRAMPING

## 2024-07-10 NOTE — PROGRESS NOTES
Dr. Arechiga called and updated on patient statues. SVE performed and pt 4/90/-2 @ 0332. Dr. Potts to bedside for AROM and IUPC placement per MD Arechiga's request. Dr. Potts to bedside. SVE per MD Potts performed and pt 6/90/-2 @ 3127. AROM performed by DR. Potts and IUPC placed. Will continue to update Dr. Arechiga on patient's labor progression.  Leona Mcknight RN

## 2024-07-10 NOTE — ANESTHESIA PROCEDURE NOTES
CSE Block    Patient location during procedure: OB  Start time: 7/9/2024 8:50 PM  End time: 7/9/2024 8:59 PM  Reason for block: labor epidural  Staffing  Performed: resident/CRNA   Anesthesiologist: Andi Frank MD  Resident/CRNA: Osman Parham APRN - CRNA  Performed by: Osman Parham APRN - CRNA  Authorized by: Andi Frank MD    CSE  Patient position: sitting  Prep: Betadine  Patient monitoring: continuous pulse ox and frequent blood pressure checks  Approach: midline  Provider prep: mask and sterile gloves  Spinal Needle  Needle type: pencil-tip   Needle gauge: 25 G  Needle length: 4.75 in  Epidural Needle  Injection technique: MARIO air  Needle gauge: 17 G  Needle length: 3.5 in  Location: lumbar (1-5)  Catheter  Catheter type: side hole  Catheter size: 19 G  Test dose: negative (3 cc1.5% xylocaine with epi)  AssessmentT8  Hemodynamics: stable  Additional Notes  Pt. prepped and draped in sterile fashion. Skin wheal with 1% lidocaine. 17ga touhy needle to MARIO. 25 ga. Spinal needle per touhy. CSF visualized in hub and 1cc of 0.25% bupivacaine injected. Needle withdrawn and catheter threaded. Negative test dose. Catheter secured with sterile dressing.  Preanesthetic Checklist  Completed: patient identified, IV checked, site marked, risks and benefits discussed, surgical/procedural consents, equipment checked, pre-op evaluation, anesthesia consent given, oxygen available and monitors applied/VS acknowledged

## 2024-07-10 NOTE — PROGRESS NOTES
Dr. Arechiga called with patient update. KRISH performed @ 5953. Pt now 4/80/-2 and remains intact. Plan to recheck pt around 0500 unless otherwise needed. Will continue to update Dr. Arechiga on patient status.   Leona Mcknight RN

## 2024-07-10 NOTE — PROGRESS NOTES
Asked to AROM and place IUPC    Pt comfortable with epidural    Cx 6cm/90%/-2  AROM for clear fluid  IUPC placed without difficulty  Tracing reassuring Cat 1  Contractions q 3-4 min

## 2024-07-10 NOTE — OP NOTE
Department of Obstetrics and Gynecology   Section   Operative Report      Pre-operative Diagnosis:    IUP at 40w5d  Pectus Excavatum- rods in place  Vape use in pregnancy  Hemorrhoids  History of anemia  Arrest of dilation  Fetal intolerance to labor    Post-operative Diagnosis:    IUP at 40w5d  Pectus Excavatum- rods in place  Vape use in pregnancy  Hemorrhoids  History of anemia  Arrest of dilation  Fetal intolerance to labor  S/p primary  section   Delivery male infant weighing 7 lb 15 oz with APGARs 9 and 9 at 1 and 5 minutes    Surgeon: Yas Spann DO  Assistant: Zully Hess  Anesthesia:  epidural    APGAR  Mohan Smith [[id number]]      Apgars    Living status: Living  Apgars   1 Minute:  5 Minute:  10 Minute 15 Minute 20 Minute   Skin Color: 1  1       Heart Rate: 2  2       Reflex Irritability: 2  2       Muscle Tone: 2  2       Respiratory Effort: 2  2       Total: 9  9               Apgars Assigned By: YARELY FIELDS NNP              Findings: the placenta appeared normal. The uterine outline, tubes and ovaries appeared normal  EBL: 808 mL  Drains: 350 mL  Complications: none    Delivery Summary:    The patient was seen in the Holding Room. The risks, benefits, complications, treatment options, and expected outcomes were discussed with the patient.  The patient concurred with the proposed plan, giving informed consent.  The site of surgery properly noted/marked. The patient was taken to Operating Room 1, identified,  and the procedure verified as  Delivery. A Time Out was held and the above information confirmed.    Patient's epidural was re dosed prior to the operating room.Lindquist was placed and draining clear concentrated urine. The patient was prepped (including vaginal and abdominal prep) and draped in the usual sterile manner. Laura testing was performed. Patient reported feeling sharp pain. She was given additional anesthesic, however, still    Shift Total 1.5 3151.2 3152.7 2945  2945   OUTPUT   Urine  1650 1650 700  700   Blood    808  808     Quantitative Blood Loss (mL)    808  808   Shift Total  1650 1650 1508  1508   NET 1.5 1501.2 1502.7 1437  1437       Condition:  infant stable to general nursery and mother stable    Blood Type and Rh: A POS        Rubella Immunity Status:   Immune           Infant Feeding:    unknown    Attending Attestation: I performed the procedure.

## 2024-07-10 NOTE — PLAN OF CARE
Problem: Pain  Goal: Verbalizes/displays adequate comfort level or baseline comfort level  7/10/2024 1953 by Gretel Chappell RN  Outcome: Progressing  7/10/2024 1743 by Donita Pacheco RN  Outcome: Progressing     Problem: Postpartum  Goal: Experiences normal postpartum course  Description:  Postpartum OB-Pregnancy care plan goal which identifies if the mother is experiencing a normal postpartum course  7/10/2024 1953 by Gretel Chappell RN  Outcome: Progressing  7/10/2024 1743 by Donita Pacheco RN  Outcome: Progressing  Goal: Appropriate maternal -  bonding  Description:  Postpartum OB-Pregnancy care plan goal which identifies if the mother and  are bonding appropriately  7/10/2024 1953 by Gretel Chappell RN  Outcome: Progressing  7/10/2024 1743 by Donita Pacheco RN  Outcome: Progressing  Goal: Establishment of infant feeding pattern  Description:  Postpartum OB-Pregnancy care plan goal which identifies if the mother is establishing a feeding pattern with their   7/10/2024 1953 by Gretel Chappell RN  Outcome: Progressing  7/10/2024 1743 by Donita Pacheco RN  Outcome: Progressing  Goal: Incisions, wounds, or drain sites healing without S/S of infection  7/10/2024 1953 by Gretel Chappell RN  Outcome: Progressing  7/10/2024 1743 by Donita Pacheco RN  Outcome: Progressing  Flowsheets (Taken 7/10/2024 1707)  Incisions, Wounds, or Drain Sites Healing Without Sign and Symptoms of Infection: TWICE DAILY: Assess and document skin integrity     Problem: Infection - Adult  Goal: Absence of infection at discharge  7/10/2024 1953 by Gretel Chappell RN  Outcome: Progressing  7/10/2024 1743 by Donita Pacheco RN  Outcome: Progressing  Goal: Absence of infection during hospitalization  7/10/2024 1953 by Gretel Chappell RN  Outcome: Progressing  7/10/2024 1743 by Donita Pacheco RN  Outcome: Progressing  Goal: Absence of fever/infection during anticipated neutropenic period  7/10/2024 1953 by Ta

## 2024-07-10 NOTE — PROGRESS NOTES
Dr. Spann at [facility]. Discussing non-reassuring fetal heart tones and possible c/s.

## 2024-07-10 NOTE — PROGRESS NOTES
Pt c/o perineum pressure. SVE performed. Pt 9/100/0. Dr. Arechiga updated on patient. Md dior coming on this AM to deliver patient.   Leona Mcknight RN

## 2024-07-10 NOTE — PROGRESS NOTES
Labor Progress Note    S: Pt resting comfortably following epidural redose.     O:   Vitals:    07/10/24 1200   BP: 121/80   Pulse: 92   Resp: 16   Temp: 98.3 °F (36.8 °C)   SpO2: 100%      Cat: 2  FHT baseline 120 bpm, moderate variability, accelerations present, intermittent late and early decelerations  IUPC:1-4 min  Cervix: 9.5/100/0    A/P:  18 y.o.  at 40w5d admitted for contractions, labor augmented with pitocin  - Vitals stable  - Pitocin paused twice due two prolonged decelerations first into the 90s lasting 2.5 minutes and 2nd  into the 70s lasting 8 minutes prior to resolution  - Strip improved after pausing pitocin, IV fluid bolus, and position changes  - Pt also had epidural redose due to discomfort  - Pt has not made cervical change over the past ~6 hours and intermittent late deceleration present  - Recommend proceeding with primary  section for failure to dilate and fetal intolerance to labor  - Risks reviewed including but not limited to infection, bleeding, damage to structures (bowel, bladder, ureters, etc), blood clots, etc  - Questions answered  - Pt agrees with plan  - Consent signed  - Azithromycin and ancef ordered  - Proceed with PLTCS once operating room is ready

## 2024-07-10 NOTE — PROGRESS NOTES
Labor Progress Note    S: Pt uncomfortable, feeling intense pressure with contractions.    O:   Vitals:    07/10/24 0700   BP: 127/64   Pulse: 78   Resp: 18   Temp: 98.8 °F (37.1 °C)   SpO2:       Cat: 2  FHT baseline 120 bpm, moderate variability, accelerations present, recurrent late decelerations  IUPC: 1-2 minutes  Cervix: 9/100/0    A/P:  18 y.o.  at 40w5d admitted for contractions, labor augmented  Vitals stable  Epidural in place  Pt feeling pressure  Cat 2 strip following cervical exam, recurrent late decelerations noted, decision made to place FSE  Pt denies h/o herpes, hepatitis, HIV, agreeable to placement  FSE placed with difficulty  Position change, fluid bolus given, pitocin paused  Reassess strip in 30 minutes  Continue close monitoring

## 2024-07-10 NOTE — PLAN OF CARE
Problem: Pain  Goal: Verbalizes/displays adequate comfort level or baseline comfort level  Outcome: Progressing     Problem: Postpartum  Goal: Experiences normal postpartum course  Description:  Postpartum OB-Pregnancy care plan goal which identifies if the mother is experiencing a normal postpartum course  Outcome: Progressing  Goal: Appropriate maternal -  bonding  Description:  Postpartum OB-Pregnancy care plan goal which identifies if the mother and  are bonding appropriately  Outcome: Progressing  Goal: Establishment of infant feeding pattern  Description:  Postpartum OB-Pregnancy care plan goal which identifies if the mother is establishing a feeding pattern with their   Outcome: Progressing  Goal: Incisions, wounds, or drain sites healing without S/S of infection  Outcome: Progressing  Flowsheets (Taken 7/10/2024 3237)  Incisions, Wounds, or Drain Sites Healing Without Sign and Symptoms of Infection: TWICE DAILY: Assess and document skin integrity     Problem: Infection - Adult  Goal: Absence of infection at discharge  Outcome: Progressing  Goal: Absence of infection during hospitalization  Outcome: Progressing  Goal: Absence of fever/infection during anticipated neutropenic period  Outcome: Progressing     Problem: Safety - Adult  Goal: Free from fall injury  Outcome: Progressing     Problem: Discharge Planning  Goal: Discharge to home or other facility with appropriate resources  Outcome: Progressing     Problem: Chronic Conditions and Co-morbidities  Goal: Patient's chronic conditions and co-morbidity symptoms are monitored and maintained or improved  Outcome: Progressing

## 2024-07-10 NOTE — ANESTHESIA PRE PROCEDURE
\"LABABO\"    Drug/Infectious Status (If Applicable):  No results found for: \"HIV\", \"HEPCAB\"    COVID-19 Screening (If Applicable): No results found for: \"COVID19\"        Anesthesia Evaluation  Patient summary reviewed and Nursing notes reviewed   no history of anesthetic complications:   Airway: Mallampati: II  TM distance: >3 FB   Neck ROM: full  Mouth opening: > = 3 FB   Dental:          Pulmonary:Negative Pulmonary ROS                              Cardiovascular:Negative CV ROS                      Neuro/Psych:   Negative Neuro/Psych ROS              GI/Hepatic/Renal: Neg GI/Hepatic/Renal ROS            Endo/Other: Negative Endo/Other ROS                    Abdominal: normal exam            Vascular: negative vascular ROS.         Other Findings:             Anesthesia Plan      CSE     ASA 2 - emergent             Anesthetic plan and risks discussed with patient.      Plan discussed with attending.              Alternatives to, benifits and risks of continuous lumbar epidural for labor (including, but not limited to, hypotension, spinal headache, inadequate sensory blockade) were discussed in detail with the patient.  All questions were answered to her satisfaction.  The patient desires and agrees to proceed with continuous epidural.      DIAMOND Carvalho - CRNA   7/9/2024

## 2024-07-10 NOTE — PROGRESS NOTES
NAEEM Parham to bedside for epidural placement. Epidural start time @ 2050. Test dose noted @ 2054. Pt tolerated placement well. VSS.   Leona Mcknight RN

## 2024-07-10 NOTE — LACTATION NOTE
This note was copied from a baby's chart.  LACTATION CONSULTATION  Initial Lactation Consult:   Referred by: RN request  First feeding in recovery after LTCS    Name: Mohan Smith       MRN: [id number]         YOB: 2026   Time of Birth: 2:02 PM   Gestational age: Gestational Age: 40w5d   Birth Weight: Birth Weight: 3.61 kg (7 lb 15.3 oz) Most Recent Weight: Weight: 3.61 kg (7 lb 15.3 oz) (Filed from Delivery Summary)   Weight Change from Birth: 0%           Maternal Assessment:  Maternal Data:  Information for the patient's mother:  Sarah Smith [[id number]]   20 y.o.   /Para:   Information for the patient's mother:  Sarah Smith [[id number]]      Information for the patient's mother:  Sarah Smith [[id number]]   40w5d     Prenatal Breastfeeding Education: Self Educations     Prior Breastfeeding Experience: 1st time breastfeeding with this baby     Breastfeeding Goal: Exclusively Breastfeed     Breast Assessment  Right Breast: WDL and Large  Right Nipple: Everts well , Short, and Flat   Right Areola: WDL   Right Nipple Comfort: comfortable   Right Nipple Integrity: Intact    Left Breast: WDL and Large  Left Nipple:  did not see full assessment  Left Areola: WDL   Left Nipple Comfort:  did not  assess at this consult.  Left Nipple Integrity: Intact    Medications of Concern:None    Maternal Toxicology:   Information for the patient's mother:  Sarah Smith [[id number]]     Barbiturate Screen, Ur   Date Value Ref Range Status   2024 Neg Negative <200 ng/mL Final     Benzodiazepine Screen, Urine   Date Value Ref Range Status   2024 Neg Negative <200 ng/mL Final     Cannabinoid Scrn, Ur   Date Value Ref Range Status   2024 Neg Negative <50 ng/mL Final     Cocaine Metabolite Screen, Urine   Date Value Ref Range Status   2024 Neg Negative <300 ng/mL Final     Methadone Screen, Urine   Date Value Ref Range Status   2024 Neg Negative <300 ng/mL  Type 2 diabetes mellitus with diabetic peripheral angiopathy without gangrene, without long-term current use of insulin

## 2024-07-11 LAB
DEPRECATED RDW RBC AUTO: 15.9 % (ref 12.4–15.4)
HCT VFR BLD AUTO: 22 % (ref 36–48)
HGB BLD-MCNC: 7.6 G/DL (ref 12–16)
MCH RBC QN AUTO: 31.7 PG (ref 26–34)
MCHC RBC AUTO-ENTMCNC: 34.6 G/DL (ref 31–36)
MCV RBC AUTO: 91.6 FL (ref 80–100)
PLATELET # BLD AUTO: 148 K/UL (ref 135–450)
PMV BLD AUTO: 7.5 FL (ref 5–10.5)
RBC # BLD AUTO: 2.4 M/UL (ref 4–5.2)
WBC # BLD AUTO: 12 K/UL (ref 4–11)

## 2024-07-11 PROCEDURE — 2580000003 HC RX 258: Performed by: STUDENT IN AN ORGANIZED HEALTH CARE EDUCATION/TRAINING PROGRAM

## 2024-07-11 PROCEDURE — 1220000000 HC SEMI PRIVATE OB R&B

## 2024-07-11 PROCEDURE — 6360000002 HC RX W HCPCS: Performed by: STUDENT IN AN ORGANIZED HEALTH CARE EDUCATION/TRAINING PROGRAM

## 2024-07-11 PROCEDURE — 36415 COLL VENOUS BLD VENIPUNCTURE: CPT

## 2024-07-11 PROCEDURE — 6370000000 HC RX 637 (ALT 250 FOR IP): Performed by: STUDENT IN AN ORGANIZED HEALTH CARE EDUCATION/TRAINING PROGRAM

## 2024-07-11 PROCEDURE — 6370000000 HC RX 637 (ALT 250 FOR IP)

## 2024-07-11 PROCEDURE — 85027 COMPLETE CBC AUTOMATED: CPT

## 2024-07-11 RX ORDER — IBUPROFEN 800 MG/1
TABLET ORAL
Status: COMPLETED
Start: 2024-07-11 | End: 2024-07-11

## 2024-07-11 RX ADMIN — DOCUSATE SODIUM 100 MG: 100 CAPSULE, LIQUID FILLED ORAL at 08:49

## 2024-07-11 RX ADMIN — FERROUS SULFATE TAB 325 MG (65 MG ELEMENTAL FE) 325 MG: 325 (65 FE) TAB at 08:48

## 2024-07-11 RX ADMIN — PRENATAL WITH FERROUS FUM AND FOLIC ACID 1 TABLET: 3080; 920; 120; 400; 22; 1.84; 3; 20; 10; 1; 12; 200; 27; 25; 2 TABLET ORAL at 08:51

## 2024-07-11 RX ADMIN — MOXIFLOXACIN HYDROCHLORIDE 800 MG: 400 TABLET, FILM COATED ORAL at 15:05

## 2024-07-11 RX ADMIN — Medication 10 ML: at 20:01

## 2024-07-11 RX ADMIN — IBUPROFEN 800 MG: 800 TABLET, FILM COATED ORAL at 15:05

## 2024-07-11 RX ADMIN — KETOROLAC TROMETHAMINE 30 MG: 30 INJECTION, SOLUTION INTRAMUSCULAR at 00:03

## 2024-07-11 RX ADMIN — KETOROLAC TROMETHAMINE 30 MG: 30 INJECTION, SOLUTION INTRAMUSCULAR at 05:52

## 2024-07-11 RX ADMIN — ACETAMINOPHEN 1000 MG: 500 TABLET ORAL at 18:34

## 2024-07-11 RX ADMIN — DOCUSATE SODIUM 100 MG: 100 CAPSULE, LIQUID FILLED ORAL at 20:01

## 2024-07-11 RX ADMIN — Medication 10 ML: at 05:53

## 2024-07-11 RX ADMIN — MOXIFLOXACIN HYDROCHLORIDE 800 MG: 400 TABLET, FILM COATED ORAL at 23:00

## 2024-07-11 RX ADMIN — ACETAMINOPHEN 1000 MG: 500 TABLET ORAL at 08:48

## 2024-07-11 ASSESSMENT — PAIN DESCRIPTION - LOCATION
LOCATION: ABDOMEN
LOCATION: INCISION
LOCATION: INCISION

## 2024-07-11 ASSESSMENT — PAIN SCALES - GENERAL
PAINLEVEL_OUTOF10: 2
PAINLEVEL_OUTOF10: 0
PAINLEVEL_OUTOF10: 1
PAINLEVEL_OUTOF10: 3

## 2024-07-11 ASSESSMENT — PAIN DESCRIPTION - DESCRIPTORS
DESCRIPTORS: DISCOMFORT
DESCRIPTORS: SORE
DESCRIPTORS: CRAMPING
DESCRIPTORS: ACHING;BURNING

## 2024-07-11 ASSESSMENT — PAIN - FUNCTIONAL ASSESSMENT
PAIN_FUNCTIONAL_ASSESSMENT: ACTIVITIES ARE NOT PREVENTED
PAIN_FUNCTIONAL_ASSESSMENT: ACTIVITIES ARE NOT PREVENTED

## 2024-07-11 NOTE — PLAN OF CARE
Problem: Pain  Goal: Verbalizes/displays adequate comfort level or baseline comfort level  2024 by Christie Mayes RN  Outcome: Progressing  7/10/2024 1953 by Gretel Chappell RN  Outcome: Progressing  7/10/2024 1743 by Donita Pacheco RN  Outcome: Progressing     Problem: Postpartum  Goal: Experiences normal postpartum course  Description:  Postpartum OB-Pregnancy care plan goal which identifies if the mother is experiencing a normal postpartum course  2024 by Christie Mayes RN  Outcome: Progressing  7/10/2024 1953 by Gretel Chappell RN  Outcome: Progressing  7/10/2024 1743 by Donita Pacheco RN  Outcome: Progressing  Goal: Appropriate maternal -  bonding  Description:  Postpartum OB-Pregnancy care plan goal which identifies if the mother and  are bonding appropriately  2024 by Christie Mayes RN  Outcome: Progressing  7/10/2024 1953 by Gretel Chappell RN  Outcome: Progressing  7/10/2024 1743 by Donita Pacheco RN  Outcome: Progressing  Goal: Establishment of infant feeding pattern  Description:  Postpartum OB-Pregnancy care plan goal which identifies if the mother is establishing a feeding pattern with their   2024 by Christie Mayes RN  Outcome: Progressing  7/10/2024 1953 by Gretel Chappell RN  Outcome: Progressing  7/10/2024 1743 by Donita Pacheco RN  Outcome: Progressing  Goal: Incisions, wounds, or drain sites healing without S/S of infection  2024 by Christie Mayes RN  Outcome: Progressing  7/10/2024 1953 by Gretel Chappell RN  Outcome: Progressing  7/10/2024 1743 by Donita Pacheco RN  Outcome: Progressing  Flowsheets (Taken 7/10/2024 1707)  Incisions, Wounds, or Drain Sites Healing Without Sign and Symptoms of Infection: TWICE DAILY: Assess and document skin integrity     Problem: Infection - Adult  Goal: Absence of infection at discharge  2024 by Christie Mayes RN  Outcome: Progressing  7/10/2024 1953 by  Chappell, Gretel, RN  Outcome: Progressing  7/10/2024 1743 by Donita Pacheco RN  Outcome: Progressing  Goal: Absence of infection during hospitalization  2024 by Christie Mayes RN  Outcome: Progressing  7/10/2024 1953 by Gretel Chappell RN  Outcome: Progressing  7/10/2024 1743 by Donita Pacheco RN  Outcome: Progressing  Goal: Absence of fever/infection during anticipated neutropenic period  2024 by Christie Mayes RN  Outcome: Progressing  7/10/2024 1953 by Gretel Chappell RN  Outcome: Progressing  7/10/2024 1743 by Donita Pacheco RN  Outcome: Progressing     Problem: Safety - Adult  Goal: Free from fall injury  2024 by Christie Mayes RN  Outcome: Progressing  7/10/2024 1953 by Gretel Chappell RN  Outcome: Progressing  7/10/2024 1743 by Donita Pacheco RN  Outcome: Progressing     Problem: Discharge Planning  Goal: Discharge to home or other facility with appropriate resources  2024 by Christie Mayes RN  Outcome: Progressing  7/10/2024 1953 by Gretel Chappell RN  Outcome: Progressing  7/10/2024 1743 by Donita Pacheco RN  Outcome: Progressing     Problem: Chronic Conditions and Co-morbidities  Goal: Patient's chronic conditions and co-morbidity symptoms are monitored and maintained or improved  2024 by Christie Mayes RN  Outcome: Progressing  7/10/2024 1953 by Gretel Chappell RN  Outcome: Progressing  7/10/2024 1743 by Donita Pacheco RN  Outcome: Progressing     Problem: Vaginal Birth or  Section  Goal: Fetal and maternal status remain reassuring during the birth process  Description:  Birth OB-Pregnancy care plan goal which identifies if the fetal and maternal status remain reassuring during the birth process  7/10/2024 1743 by Donita Pacheco RN  Outcome: Completed

## 2024-07-11 NOTE — PLAN OF CARE
Problem: Pain  Goal: Verbalizes/displays adequate comfort level or baseline comfort level  2024 by Florencia Fernando RN  Outcome: Progressing  2024 075 by Bertrand Alicea RN  Outcome: Progressing  2024 07 by Christie Mayes RN  Outcome: Progressing     Problem: Postpartum  Goal: Experiences normal postpartum course  Description:  Postpartum OB-Pregnancy care plan goal which identifies if the mother is experiencing a normal postpartum course  2024 by Florencia Fernando RN  Outcome: Progressing  2024 075 by Bertrand Alicea RN  Outcome: Progressing  2024 07 by Christie Mayes RN  Outcome: Progressing  Goal: Appropriate maternal -  bonding  Description:  Postpartum OB-Pregnancy care plan goal which identifies if the mother and  are bonding appropriately  2024 by Florencia Fernando RN  Outcome: Progressing  2024 by Bertrand Alicea RN  Outcome: Progressing  2024 by Christie Mayes RN  Outcome: Progressing  Goal: Establishment of infant feeding pattern  Description:  Postpartum OB-Pregnancy care plan goal which identifies if the mother is establishing a feeding pattern with their   2024 by Florencia Fernando RN  Outcome: Progressing  2024 by Bertrand Alicea RN  Outcome: Progressing  2024 by Christie Mayes RN  Outcome: Progressing  Goal: Incisions, wounds, or drain sites healing without S/S of infection  2024 by Florencia Fernando RN  Outcome: Progressing  2024 075 by Bertrand Alicea RN  Outcome: Progressing  202434 by Christie Mayes RN  Outcome: Progressing     Problem: Infection - Adult  Goal: Absence of infection at discharge  2024 by Florencia Fernando RN  Outcome: Progressing  2024 by Bertrand Alicea RN  Outcome: Progressing  2024 by Christie Mayes RN  Outcome: Progressing  Goal: Absence of infection during

## 2024-07-11 NOTE — PROGRESS NOTES
Department of Obstetrics and Gynecology  Labor and Delivery  Post Partum Progress Note      SUBJECTIVE:  18 y.o. yo  PPD # 1 s/p primary  delivery for arrest of dilation in setting of FHR decelerations.  Pain is controlled.  Lochia is light.  Tolerating po, ambulating, voiding without difficulty.  Breast feeding.    OBJECTIVE:      Vitals:  BP (!) 118/57   Pulse 81   Temp 98.4 °F (36.9 °C) (Oral)   Resp 16   Ht 1.651 m (5' 5\")   Wt 78.9 kg (174 lb)   SpO2 97%   Breastfeeding Unknown   BMI 28.96 kg/m²     CONSTITUTIONAL:  awake, alert, cooperative, no apparent distress, and appears stated age  ABDOMEN:  nontender, fundus @ U-1, incision is closed, healing well  CHEST/BREASTS:  no increased work of breathing  MUSCULOSKELETAL:  nontender legs, trace edema    DATA:    WBC/Hgb/Hct/Plts:  12.0/7.6/22.0/148 ( 0642)     ASSESSMENT & PLAN:      Active Problems:    Postpartum care following  delivery    Asymptomatic anemia   Patient desires circumcision for son: d/w pt. Risks/benefits/alternatives/expected outcomes of circumcision, questions answered, mother consents for procedure to be performed.    Plan: Routine postpartum care  Discharge to home likely tomorrow    Isabel Tafoya MD

## 2024-07-11 NOTE — LACTATION NOTE
This note was copied from a baby's chart.  LACTATION CONSULTATION      Follow-up Consult: Reason for Follow-up: assist with latching , assess needs , provide education, and RN request         Name: Mohan Smith       MRN: [id number]               YOB: 2026   Time of Birth: 2:02 PM   Gestational age: Gestational Age: 40w5d   Birth Weight: Birth Weight: 3.61 kg (7 lb 15.3 oz) Most Recent Weight: Weight: 3.565 kg (7 lb 13.8 oz)   Weight Change from Birth: -1%            Maternal Assessment:      Maternal Data:   Information for the patient's mother:  Sarah Smith [[id number]]   20 y.o.    /Para:   Information for the patient's mother:  Sarah Smith [[id number]]        Information for the patient's mother:  Sarah Smith [[id number]]   40w5d          Breast Assessment  Right Breast: WDL  Right Nipple: Everts well   Right Areola: WDL   Right Nipple Comfort:  reports slight pinching feeling, tolerable   Right Nipple Integrity: Intact    Left Breast: Breasts not assessed this encounter      Infant Assessment:      DOL:10 hours       Feeding: Breastfeeding      Nipple Shield in Use: No     I&O adequacy:  Urine output: is established  Stool output: is established  Percent weight change from birthweight: -1%     Oral Assessment:   Palate:intact    Frenulum:infant appears to have visible restriction in the movement of tongue to lift and fully extend. MOB reports that she also had a tongue tie that ws not released as an infant but ripped by a dentist with compression plates     Frenotomy Performed: Awaiting provider assessment/evaluation         TABBY SCORE: 5    Scoring of TABBY tool  A score of:   8 indicates normal tongue function;   6 or 7 are considered as borderline: suggest a                              'wait and see' approach with support for  breastfeeding positioning & attachment;   5 or below suggests that there is impairment of  tongue function.       Birth

## 2024-07-11 NOTE — ANESTHESIA POSTPROCEDURE EVALUATION
Department of Anesthesiology  Postprocedure Note    Patient: Sarah Smith  MRN: [id number]  YOB: 2005  Date of evaluation: [birth year]24    Procedure Summary       Date: 24 Room / Location: [facility]D [address]    Anesthesia Start:  Anesthesia Stop: 07/10/24 1512    Procedures:        SECTION (Abdomen)      Labor Analgesia Diagnosis:       Failure to progress in second stage of labor      (Arrest of Descent)    Surgeons: Yas Spann DO Responsible Provider: Chato Mcdowell MD    Anesthesia Type: CSE ASA Status: 2 - Emergent            Anesthesia Type: No value filed.    Elpidio Phase I: Elpidio Score: 9    Elpidio Phase II:      Anesthesia Post Evaluation    Patient location during evaluation: bedside  Patient participation: complete - patient participated  Level of consciousness: awake and alert  Nausea & Vomiting: no nausea and no vomiting  Cardiovascular status: hemodynamically stable  Hydration status: stable  Pain management: adequate and satisfactory to patient        No notable events documented.

## 2024-07-11 NOTE — LACTATION NOTE
This note was copied from a baby's chart.  LACTATION CONSULTATION      Follow-up Consult: Reason for Follow-up: assess needs  and provide education     Name: Mohan Smith       MRN: [id number]               YOB: 2026   Time of Birth: 2:02 PM   Gestational age: Gestational Age: 40w5d   Birth Weight: Birth Weight: 3.61 kg (7 lb 15.3 oz) Most Recent Weight: Weight: 3.565 kg (7 lb 13.8 oz)   Weight Change from Birth: -1%            Maternal Assessment:      Maternal Data:   Information for the patient's mother:  Sarah Smith [[id number]]   20 y.o.    /Para:   Information for the patient's mother:  Sarah Smith [[id number]]        Information for the patient's mother:  Sarah Smith [[id number]]   40w5d          Breast Assessment  Right Breast: Breasts not assessed this encounter   Left Breast: Breasts not assessed this encounter     Infant Assessment:      DOL:5 hours       Feeding: Breastfeeding      Nipple Shield in Use: No      I&O adequacy:  Urine output: is established  Stool output: is established  Percent weight change from birthweight: -1%     Oral Assessment:   Oral assessment not completed at this time.        Birth Factors/Diagnosis that could create risk for breastfeeding:     Glucose: No     Intervention during consultation:     Interventions Performed:   Education      Latch & Positioning: MOB reports infant is feeding well and no needs at this time. MOB resting and infant being held by support at bedside. Encouraged to call for lactation assist. Name and number on white board.     Manual Expression:  Not addressed at this time     Bedside Breast Pump:   N/A    Breast Shield Size:   N/A    Amount of milk expressed:   N/A    Pump Arrangements:  Owns breast pump    Pump Distributed: No     Education:  Educated and encouraged to call for lactation assist with feeds.           Action/Plan:  Breastfeed on cue and at least 8 times/24 hours, unlimited timing. May not

## 2024-07-11 NOTE — LACTATION NOTE
This note was copied from a baby's chart.  LACTATION CONSULTATION      Follow-up Consult: Reason for Follow-up: assess needs  and provide education  MOB reports that infant is latching to both breast and feeding well. Denies concerns at this consult.       Name: Mohan Smith       MRN: [id number]               YOB: 2026   Time of Birth: 2:02 PM   Gestational age: Gestational Age: 40w5d   Birth Weight: Birth Weight: 3.61 kg (7 lb 15.3 oz) Most Recent Weight: Weight: 3.565 kg (7 lb 13.8 oz)   Weight Change from Birth: -1%            Maternal Assessment:      Maternal Data:   Information for the patient's mother:  Sarah Smith [[id number]]   20 y.o.    /Para:   Information for the patient's mother:  Sarah Smith [[id number]]        Information for the patient's mother:  Sarah Smith [[id number]]   40w5d          Breast Assessment  Right Breast: Breasts not assessed this encounter     Left Breast: Breasts not assessed this encounter      Infant Assessment:      DOL:Infant 22 hours old.      Feeding: Breastfeeding         Nipple Shield in Use:  No  Nipple Shield Size:      I&O adequacy:  Urine output: is established  Stool output: is established  Percent weight change from birthweight: -1%     Oral Assessment: Did not assess at this encounter.    Birth Factors/Diagnosis that could create risk for breastfeeding:   Tongue tie being evaluated by peds today. MOB reports latching is going well and states she isn't having pain.    Glucose:  No       Intervention during consultation:     Interventions Performed:   Education  and Skin to skin     Latch & Positioning: Did not  assist with this encounter. Encouraged close follow up care.    Manual Expression:  MOB states she understands     Bedside Breast Pump:   N/A    Breast Shield Size:   N/A    Amount of milk expressed:   N/A    Pump Arrangements:  Owns breast pump    Pump Distributed: No     Education:  Feeding frequency & feeding

## 2024-07-11 NOTE — PLAN OF CARE
Problem: Pain  Goal: Verbalizes/displays adequate comfort level or baseline comfort level  2024 by Bertrand Alicea RN  Outcome: Progressing  2024 by Christie Mayes RN  Outcome: Progressing  7/10/2024 1953 by Gretel Chappell RN  Outcome: Progressing     Problem: Postpartum  Goal: Experiences normal postpartum course  Description:  Postpartum OB-Pregnancy care plan goal which identifies if the mother is experiencing a normal postpartum course  2024 by Bertrand Alicea RN  Outcome: Progressing  2024 by Christie Mayes RN  Outcome: Progressing  7/10/2024 1953 by Gretel Chappell RN  Outcome: Progressing  Goal: Appropriate maternal -  bonding  Description:  Postpartum OB-Pregnancy care plan goal which identifies if the mother and  are bonding appropriately  2024 by Bertrand Alicea RN  Outcome: Progressing  2024 by Christie Mayes RN  Outcome: Progressing  7/10/2024 1953 by Gretel Chappell RN  Outcome: Progressing  Goal: Establishment of infant feeding pattern  Description:  Postpartum OB-Pregnancy care plan goal which identifies if the mother is establishing a feeding pattern with their   2024 by Bertrand Alicea RN  Outcome: Progressing  2024 by Christie Mayes RN  Outcome: Progressing  7/10/2024 1953 by Gretel Chappell RN  Outcome: Progressing  Goal: Incisions, wounds, or drain sites healing without S/S of infection  2024 by Bertrand Alicea RN  Outcome: Progressing  2024 by Christie Mayes RN  Outcome: Progressing  7/10/2024 1953 by Gretel Chappell RN  Outcome: Progressing     Problem: Infection - Adult  Goal: Absence of infection at discharge  2024 by Bertrand Alicea RN  Outcome: Progressing  2024 by Christie Mayes RN  Outcome: Progressing  7/10/2024 1953 by Gretel Chappell RN  Outcome: Progressing  Goal: Absence of infection during hospitalization  2024 by

## 2024-07-12 ENCOUNTER — LACTATION ENCOUNTER (OUTPATIENT)
Dept: NURSERY | Age: 19
End: 2024-07-12

## 2024-07-12 VITALS
OXYGEN SATURATION: 97 % | HEIGHT: 65 IN | HEART RATE: 71 BPM | RESPIRATION RATE: 16 BRPM | DIASTOLIC BLOOD PRESSURE: 67 MMHG | WEIGHT: 174 LBS | SYSTOLIC BLOOD PRESSURE: 122 MMHG | TEMPERATURE: 97.9 F | BODY MASS INDEX: 28.99 KG/M2

## 2024-07-12 PROBLEM — O47.9 BRAXTON HICKS CONTRACTIONS: Status: RESOLVED | Noted: 2024-07-01 | Resolved: 2024-07-12

## 2024-07-12 PROCEDURE — 2500000003 HC RX 250 WO HCPCS: Performed by: STUDENT IN AN ORGANIZED HEALTH CARE EDUCATION/TRAINING PROGRAM

## 2024-07-12 PROCEDURE — 6370000000 HC RX 637 (ALT 250 FOR IP): Performed by: STUDENT IN AN ORGANIZED HEALTH CARE EDUCATION/TRAINING PROGRAM

## 2024-07-12 PROCEDURE — 2580000003 HC RX 258: Performed by: STUDENT IN AN ORGANIZED HEALTH CARE EDUCATION/TRAINING PROGRAM

## 2024-07-12 RX ORDER — PSEUDOEPHEDRINE HCL 30 MG
100 TABLET ORAL 2 TIMES DAILY
Qty: 60 CAPSULE | Refills: 0 | Status: SHIPPED | OUTPATIENT
Start: 2024-07-12

## 2024-07-12 RX ORDER — FERROUS SULFATE 325(65) MG
325 TABLET ORAL EVERY OTHER DAY
Qty: 30 TABLET | Refills: 3 | Status: SHIPPED | OUTPATIENT
Start: 2024-07-12

## 2024-07-12 RX ORDER — IBUPROFEN 800 MG/1
800 TABLET ORAL EVERY 8 HOURS
Qty: 30 TABLET | Refills: 0 | Status: SHIPPED | OUTPATIENT
Start: 2024-07-12

## 2024-07-12 RX ADMIN — FAMOTIDINE 20 MG: 10 INJECTION, SOLUTION INTRAVENOUS at 09:17

## 2024-07-12 RX ADMIN — DOCUSATE SODIUM 100 MG: 100 CAPSULE, LIQUID FILLED ORAL at 09:17

## 2024-07-12 RX ADMIN — Medication 10 ML: at 09:18

## 2024-07-12 RX ADMIN — MOXIFLOXACIN HYDROCHLORIDE 800 MG: 400 TABLET, FILM COATED ORAL at 06:53

## 2024-07-12 RX ADMIN — ACETAMINOPHEN 1000 MG: 500 TABLET ORAL at 02:56

## 2024-07-12 RX ADMIN — PRENATAL WITH FERROUS FUM AND FOLIC ACID 1 TABLET: 3080; 920; 120; 400; 22; 1.84; 3; 20; 10; 1; 12; 200; 27; 25; 2 TABLET ORAL at 09:17

## 2024-07-12 RX ADMIN — ACETAMINOPHEN 1000 MG: 500 TABLET ORAL at 11:18

## 2024-07-12 ASSESSMENT — PAIN SCALES - GENERAL
PAINLEVEL_OUTOF10: 1
PAINLEVEL_OUTOF10: 0
PAINLEVEL_OUTOF10: 3

## 2024-07-12 ASSESSMENT — PAIN DESCRIPTION - LOCATION
LOCATION: ABDOMEN
LOCATION: INCISION

## 2024-07-12 ASSESSMENT — PAIN DESCRIPTION - DESCRIPTORS
DESCRIPTORS: DISCOMFORT
DESCRIPTORS: DULL

## 2024-07-12 ASSESSMENT — PAIN DESCRIPTION - ORIENTATION: ORIENTATION: LOWER

## 2024-07-12 NOTE — PROGRESS NOTES
Postpartum and infant care teaching completed and forms signed by patient. Copy witnessed by RN and given to patient. Patient verbalized understanding of all teaching points.

## 2024-07-12 NOTE — PLAN OF CARE
Problem: Pain  Goal: Verbalizes/displays adequate comfort level or baseline comfort level  2024 1238 by Diann Londono RN  Outcome: Completed  2024 1159 by Diann Londono RN  Outcome: Progressing  Flowsheets (Taken 2024 1036)  Verbalizes/displays adequate comfort level or baseline comfort level:   Encourage patient to monitor pain and request assistance   Assess pain using appropriate pain scale   Administer analgesics based on type and severity of pain and evaluate response   Implement non-pharmacological measures as appropriate and evaluate response   Consider cultural and social influences on pain and pain management   Notify Licensed Independent Practitioner if interventions unsuccessful or patient reports new pain     Problem: Postpartum  Goal: Experiences normal postpartum course  Description:  Postpartum OB-Pregnancy care plan goal which identifies if the mother is experiencing a normal postpartum course  2024 1238 by Diann Londono RN  Outcome: Completed  2024 1159 by Diann Londono RN  Outcome: Progressing  Goal: Appropriate maternal -  bonding  Description:  Postpartum OB-Pregnancy care plan goal which identifies if the mother and  are bonding appropriately  2024 1238 by Diann Londono RN  Outcome: Completed  2024 1159 by Diann Londono RN  Outcome: Progressing  Goal: Establishment of infant feeding pattern  Description:  Postpartum OB-Pregnancy care plan goal which identifies if the mother is establishing a feeding pattern with their   2024 1238 by Diann Londono RN  Outcome: Completed  2024 1159 by Diann Londono RN  Outcome: Progressing  Goal: Incisions, wounds, or drain sites healing without S/S of infection  2024 1238 by Diann Londono RN  Outcome: Completed  2024 1159 by Diann Londono RN  Outcome: Progressing     Problem: Infection - Adult  Goal: Absence of infection at  discharge  7/12/2024 1238 by Diann Londono RN  Outcome: Completed  7/12/2024 1159 by Diann Londono RN  Outcome: Progressing  Goal: Absence of infection during hospitalization  7/12/2024 1238 by Diann Londono RN  Outcome: Completed  7/12/2024 1159 by Diann Londono RN  Outcome: Progressing  Goal: Absence of fever/infection during anticipated neutropenic period  7/12/2024 1238 by Diann Londono RN  Outcome: Completed  7/12/2024 1159 by Diann Londono RN  Outcome: Progressing     Problem: Safety - Adult  Goal: Free from fall injury  7/12/2024 1238 by Diann Londono RN  Outcome: Completed  7/12/2024 1159 by Diann Londono RN  Outcome: Progressing     Problem: Discharge Planning  Goal: Discharge to home or other facility with appropriate resources  7/12/2024 1238 by Diann Londono RN  Outcome: Completed  7/12/2024 1159 by Diann Londono RN  Outcome: Progressing     Problem: Chronic Conditions and Co-morbidities  Goal: Patient's chronic conditions and co-morbidity symptoms are monitored and maintained or improved  7/12/2024 1238 by Diann Londono RN  Outcome: Completed  7/12/2024 1159 by Diann Londono RN  Outcome: Progressing

## 2024-07-12 NOTE — PROGRESS NOTES
Post-Op  Delivery Progress Note    Sarah Smith  [id number]    Subjective:     Post-op Day 2    Patient seen by bedside. Reports pain is well controlled with pain meds. Ambulated with out need for assistance, Tolerated regular diet. Flatus passed, Bowel movement - once  Lochia - moderate  breast feeding baby.      Objective:   Vitals:    24 1215 24 0417 24 1036   BP: 120/75 (!) 127/59 123/66 122/67   Pulse: 72 81 79 71   Resp: 16 16 26 16   Temp: 97.4 °F (36.3 °C) 98.1 °F (36.7 °C) 98.1 °F (36.7 °C) 97.9 °F (36.6 °C)   TempSrc: Oral Oral Oral Oral   SpO2: 97% 98%  97%   Weight:       Height:         I/O last 3 completed shifts:  In: 10 [I.V.:10]  Out:  [Urine:]  No intake/output data recorded.    Physical Examination:   General appearance - No acute distress.  Pulmonary - non labored breathing  Abdomen - Soft, nontender, normal bowel sounds  Uterine Fundus - firm, below umbilicus  Lochia - minimal  Incision - clean/ dry/ intact. Dressing off  Extremities - Warm, well perfused. + 1 pitting edema, 2+ DP pulses b/l    Review of Systems:  Neuro - Pt denies dizziness, lightheadedness, headache, visual changes  Pulm - Pt denies shortness of breath  CV - Pt denies chest pain  GI - Pt denies nausea/vomiting, RUQ pain    Post-Op Labs:  Recent Labs     24  0642   WBC 12.0*   RBC 2.40*   HGB 7.6*   HCT 22.0*   MCV 91.6   MCH 31.7   MCHC 34.6   RDW 15.9*     A pos  Rubella Immune    Assessment/Plan:    POD 2   - POD 1 CBC stable  - wound care discussed  - meeting post op goals  - plans to continue with breast feeding  - baby s/p circ  - desires DC home  - fu in 2 wks for incision check    Teresa Landaverde MD  [facility]

## 2024-07-12 NOTE — DISCHARGE SUMMARY
ID bands checked. Infant's ID band and Mother's matching ID bands removed and taped , the mother verified as correct and witnessed by RN.  Umbilical clamp and HUGS tag removed. Mom and  Infant discharged via wheelchair to private car.  Infant placed in car seat per parents.  Mom and baby accompanied by family and in stable condition.

## 2024-07-12 NOTE — LACTATION NOTE
continues to work on deep latch to promote healing.     Pump Arrangements:  Owns breast pump    Pump Distributed: No     Education:  Latch & positioning , Feeding frequency & feeding cues , Exclusive breastfeeding , Discharge breastfeeding education , Breastfeeding Booklet reviewed , No artificial nipples , Risks of formula feeding , Expected intake and output , Feeding and diaper log , Skin to skin , Engorgement prevention & management , Hand expression , [facility] , and [facility]           Action/Plan:  Breastfeed on cue and at least 8 times/24 hours, unlimited timing. May not nurse this often in the first 24 hours. Wake baby and offer breastfeeding if it has been 3 hours since the beginning of last feeding. Place infant skin to skin if infant will not breastfeed at 3 hours.   Hand express prior to latch to lizbeth nipple and entice infant to the breast.   It is important to use gentle stimulation during feeding to promote active eating. Offer both breasts at every feeding. Burp infant in between sides. Alternate which breast is used first.   Offer STS often while awake. Mother holding infant skin to skin between feedings will promote milk supply and allow infant to rest more deeply.   Maintain a feeding log until infant is gaining weight and seen by primary care physician.   Request breastfeeding assistance from LC or RN as needed.     Feeding Plan reviewed with: Parents     Response:   Verbalized understanding of education and instruction, Active in care, and Pleased

## 2024-07-12 NOTE — LACTATION NOTE
This note was copied from a baby's chart.  Lactation Progress Note      Data:    F/U consult for primip on day 1 pp with an infant born at 40.5 weeks gestation. MOB reports infant was latching well before circumcision but it was pinchy, thought that's what it's supposed to feel like. Since circ, baby has been very sleepy and not interested in feeding. RN calling for consult to help MOB with latching sleepy baby. Infant noted to be tongue tied, TABBY 5.    Urine output:   established   Stool output:   established  Percent weight change from birth:  -1%         Action:    Introduced self & ensured name & lactation # is on whiteboard in room. Reviewed cross cradle & football positions, how to support infants head, how to support the breast, and steps for a DOMINGA. Woke infant and assisted mother position infant in cross cradle at right breast. Infant achieved a deep latch, SRS noted. MOB states it feels pinchy. Broke suction & re-latched again with a deep latch. Observed 7 minutes of feed, infant still at the breast after consult ended. Suggested parents speak with ped to determine if frenotomy is warranted.     Reviewed breastfeeding education, what to expect with cluster feeding, soothing sore nipples, & infant feeding cues. Encouraged mother to allow infant to breast feed on demand anytime feeding cues are shown and if no feeding cues are shown to attempt to wake infant to feed every 2-3 hours. If infant is still too sleepy to latch to hand express colostrum into infants mouth for about ten minutes, then try again in 2-3 hours. After the first day of life to breast feed a minimum of 8-12 times a day per 24 hour period.     Also encouraged mother to avoid giving infant a pacifier, bottle, or pump for at least the first two weeks of life or until breast feeding is well established. Encouraged good hydration, nutrition, and rest, and to keep taking prenatal or multivitamin while lactating. Encouraged much skin to skin  between mother and infant and father and infant. Breast feeding log reviewed, all questions answered. Mother encouraged to call lactation for F/U care as needed.     Response:    MOB verbalized an understanding of education provided and will call for assistance as needed.

## 2024-07-12 NOTE — PLAN OF CARE
Problem: Pain  Goal: Verbalizes/displays adequate comfort level or baseline comfort level  Outcome: Progressing  Flowsheets (Taken 2024 1036)  Verbalizes/displays adequate comfort level or baseline comfort level:   Encourage patient to monitor pain and request assistance   Assess pain using appropriate pain scale   Administer analgesics based on type and severity of pain and evaluate response   Implement non-pharmacological measures as appropriate and evaluate response   Consider cultural and social influences on pain and pain management   Notify Licensed Independent Practitioner if interventions unsuccessful or patient reports new pain     Problem: Postpartum  Goal: Experiences normal postpartum course  Description:  Postpartum OB-Pregnancy care plan goal which identifies if the mother is experiencing a normal postpartum course  Outcome: Progressing  Goal: Appropriate maternal -  bonding  Description:  Postpartum OB-Pregnancy care plan goal which identifies if the mother and  are bonding appropriately  Outcome: Progressing  Goal: Establishment of infant feeding pattern  Description:  Postpartum OB-Pregnancy care plan goal which identifies if the mother is establishing a feeding pattern with their   Outcome: Progressing  Goal: Incisions, wounds, or drain sites healing without S/S of infection  Outcome: Progressing     Problem: Infection - Adult  Goal: Absence of infection at discharge  Outcome: Progressing  Goal: Absence of infection during hospitalization  Outcome: Progressing  Goal: Absence of fever/infection during anticipated neutropenic period  Outcome: Progressing     Problem: Safety - Adult  Goal: Free from fall injury  Outcome: Progressing     Problem: Discharge Planning  Goal: Discharge to home or other facility with appropriate resources  Outcome: Progressing     Problem: Chronic Conditions and Co-morbidities  Goal: Patient's chronic conditions and co-morbidity symptoms are

## 2024-07-12 NOTE — PROGRESS NOTES
Discharge Phone Call    Patient Name: Sarah Smith     OB Care Provider: Elizabeth Arechiga DO Discharge Date: 2024    Disposition of baby:    Phone Number: [phone] (home)     Attempts to Contact:  Date:    Caller  Date:    Caller  Date:    Caller    Information for the patient's :  Mohan Smith [[id number]]   Delivery Method: , Low Transverse     1.  Now that you are at home is your pain being well controlled?   Y/N   If no, instruct to call       provider.      2. Are you breastfeeding?    Y/N    Do you need any extra support from our lactation staff?      Y/N    If yes, provide number for lactation.  3. Have you made or already had your first appointment with the baby's doctor? Y/N   If no, do      you know when to schedule it?  Y/N    4. Have you scheduled your follow-up appointment?  Y/N  If no, do you know when to schedule       it?    Y/N   If no, they can find it on printed discharge instructions.  5. Did staff discuss safe sleep during your stay? Y/N   6. Did we explain things in a way you could understand?  Y/N  7. Were we respectful of your preferences for labor and birth and include you in the plan of       care?  Y/N  If no, please explain _______________________________________________  8. Is there anyone in particular you would like to mention who provided care for you? _______      _________________________________________________________________________     9. Were you given a Post-Birth Warning Signs handout?  Y/N  Do you have it somewhere      easily accessible?  Y/N  If no, please send them a copy and ask them to put it somewhere      easily found.  10. Have you been crying excessively, having anger or mood swings that feel out of control, or       feel like you can't cope with caring for yourself or baby? Y/N   If yes, they may be showing       signs of postpartum depression and should call provider. There is also a        depression test on page C5 in  their discharge booklet they can take.  13. Do you have any other questions or concerns I can address today? Y/N  ______________      _________________________________________________________________________    Information provided during call :_________________________________________________  ___________________________________________________________________________    Call completed by:____________________________    Date:_________ Time:___________

## 2025-01-27 ENCOUNTER — TRANSCRIBE ORDERS (OUTPATIENT)
Facility: HOSPITAL | Age: 20
End: 2025-01-27

## 2025-01-27 DIAGNOSIS — R10.2 PELVIC PAIN IN FEMALE: Primary | ICD-10-CM

## 2025-06-18 NOTE — DISCHARGE SUMMARY
Obstetrical Discharge Form    Primary OB Clinician: [facility]    Patient Active Problem List   Diagnosis    Anemia complicating pregnancy in third trimester    Normal labor    Status post primary low transverse  section         Antepartum complications:  Pectus excavatum- rods in place, nicotine vape use, hemorrhoids, hx anemia- hbg on admission 11.3.       Mohan Smith [[id number]]      Delivery Providers    Delivering clinician: Yas Spann DO   Provider Role    Yas Spann DO Obstetrician     Primary Nurse     Primary  Nurse     NICU Nurse     Neonatologist     Anesthesiologist     Nurse Anesthetist    Yarely Kelly APRN - CNP Nurse Practitioner     Midwife     Nursery Nurse     Respiratory Therapist     Scrub Tech     Assistant Surgeon                 Mohan Smith [[id number]]       Information    Head delivery date/time: 7/10/2024 14:02:00   Changing the 's delivery date/time could affect patient care.:      Delivery date/time:  7/10/24 1402   Delivery type: , Low Transverse    Details:  Trial of labor?: Yes    categorization: Primary    priority: unscheduled   Indications for : Arrest of Descent   Skin Incision Type: Low Transverse     Uterine Incision: Low Transverse                    Mohan Smith [[id number]]      Apgars    Living status: Living  Apgars   1 Minute:  5 Minute:  10 Minute 15 Minute 20 Minute   Skin Color: 1  1       Heart Rate: 2  2       Reflex Irritability: 2  2       Muscle Tone: 2  2       Respiratory Effort: 2  2       Total: 9  9               Apgars Assigned By: YARELY KELLY APRN NNP              Mohan Smith [[id number]]      Anesthesia    Method: Epidural, Spinal                 Intrapartum complications: Non-reassuring Fetal Status    Laceration: n/a    Episiotomy: none,    Feeding method: Breast    Rh Immune globulin given: not  Procedures  4 puffs Albuterol   [phone]  Lot# [id number]  Exp 04/30/2026

## (undated) DEVICE — GLOVE SURG SZ 55 THK91MIL ORANGE  LTX FREE SYN POLYISOPRENE

## (undated) DEVICE — TRAY URIN CATH 16FR DRNGE BG STATLOK STBL DEV F SURSTP

## (undated) DEVICE — DRESSING COMP IS W4XL10IN PD W2XL8IN CNTCT LAYR ADH

## (undated) DEVICE — Z INACTIVE USE 2660665 SOLUTION IRRIG 1000ML 0.9% SOD CHL USP POUR PLAS BTL

## (undated) DEVICE — Device

## (undated) DEVICE — GARMENT COMPR L FOR 23IN CALF FLOTRN

## (undated) DEVICE — 3M™ STERI-STRIP™ COMPOUND BENZOIN TINCTURE 40 BAGS/CARTON 4 CARTONS/CASE C1544: Brand: 3M™ STERI-STRIP™

## (undated) DEVICE — BLADE CLIPPER GEN PURP NS

## (undated) DEVICE — SUTURE VICRYL SZ 3-0 L36IN ABSRB UD L36MM CT-1 1/2 CIR J944H

## (undated) DEVICE — S/USE RESUS KIT W/O MASK (10): Brand: FISHER & PAYKEL HEALTHCARE

## (undated) DEVICE — SUTURE VICRYL SZ 0 L36IN ABSRB UD L36MM CT-1 1/2 CIR J946H

## (undated) DEVICE — PAD,NON-ADHERENT,3X8,STERILE,LF,1/PK: Brand: MEDLINE

## (undated) DEVICE — Z INACTIVE NO ACTIVE SUPPLIER APPLICATOR MEDICATED 26 CC TINT HI-LITE ORNG STRL CHLORAPREP

## (undated) DEVICE — SUTURE MONOCRYL SZ 3-0 L27IN ABSRB UD L60MM KS STR REV CUT Y523H